# Patient Record
Sex: MALE | Race: WHITE | NOT HISPANIC OR LATINO
[De-identification: names, ages, dates, MRNs, and addresses within clinical notes are randomized per-mention and may not be internally consistent; named-entity substitution may affect disease eponyms.]

---

## 2020-11-17 ENCOUNTER — APPOINTMENT (OUTPATIENT)
Dept: ELECTROPHYSIOLOGY | Facility: CLINIC | Age: 85
End: 2020-11-17
Payer: MEDICARE

## 2020-11-17 ENCOUNTER — NON-APPOINTMENT (OUTPATIENT)
Age: 85
End: 2020-11-17

## 2020-11-17 VITALS
HEART RATE: 96 BPM | OXYGEN SATURATION: 99 % | DIASTOLIC BLOOD PRESSURE: 84 MMHG | WEIGHT: 216 LBS | RESPIRATION RATE: 14 BRPM | SYSTOLIC BLOOD PRESSURE: 125 MMHG | TEMPERATURE: 94.3 F | HEIGHT: 60 IN | BODY MASS INDEX: 42.41 KG/M2

## 2020-11-17 DIAGNOSIS — Z87.891 PERSONAL HISTORY OF NICOTINE DEPENDENCE: ICD-10-CM

## 2020-11-17 DIAGNOSIS — N40.0 BENIGN PROSTATIC HYPERPLASIA WITHOUT LOWER URINARY TRACT SYMPMS: ICD-10-CM

## 2020-11-17 DIAGNOSIS — I10 ESSENTIAL (PRIMARY) HYPERTENSION: ICD-10-CM

## 2020-11-17 DIAGNOSIS — I48.21 PERMANENT ATRIAL FIBRILLATION: ICD-10-CM

## 2020-11-17 DIAGNOSIS — G45.9 TRANSIENT CEREBRAL ISCHEMIC ATTACK, UNSPECIFIED: ICD-10-CM

## 2020-11-17 DIAGNOSIS — I49.5 SICK SINUS SYNDROME: ICD-10-CM

## 2020-11-17 DIAGNOSIS — Z85.46 PERSONAL HISTORY OF MALIGNANT NEOPLASM OF PROSTATE: ICD-10-CM

## 2020-11-17 PROBLEM — Z00.00 ENCOUNTER FOR PREVENTIVE HEALTH EXAMINATION: Status: ACTIVE | Noted: 2020-11-17

## 2020-11-17 PROCEDURE — 99204 OFFICE O/P NEW MOD 45 MIN: CPT

## 2020-11-17 PROCEDURE — 93000 ELECTROCARDIOGRAM COMPLETE: CPT

## 2020-11-17 RX ORDER — TAMSULOSIN HYDROCHLORIDE 0.4 MG/1
0.4 CAPSULE ORAL
Qty: 30 | Refills: 3 | Status: ACTIVE | COMMUNITY
Start: 2020-11-17

## 2020-11-17 RX ORDER — WARFARIN 5 MG/1
5 TABLET ORAL DAILY
Qty: 90 | Refills: 3 | Status: ACTIVE | COMMUNITY
Start: 2020-11-17

## 2020-11-17 RX ORDER — METOPROLOL TARTRATE 50 MG/1
50 TABLET, FILM COATED ORAL
Qty: 60 | Refills: 3 | Status: ACTIVE | COMMUNITY
Start: 2020-11-17

## 2020-11-17 NOTE — HISTORY OF PRESENT ILLNESS
[FreeTextEntry1] : Lalito Zamudio MD\par \par Chevy Hernandez is an 84y/o man with Hx of HTN, TIA, prostate CA/BPH, and permanent atrial fibrillation, on warfarin, who presents today for initial evaluation. Admits doing well with no issues or complaints. Denies any dizziness or feeling lightheaded. Denies chest pain, palpitations, SOB, syncope or near syncope. Recently underwent Holter monitoring which revealed permanent afib, average heart rate 57bpm, max heart rate 104bpm and low heart rate with pauses in the 30s during sleeping hours. Has been on metoprolol 50mg daily.

## 2020-11-17 NOTE — DISCUSSION/SUMMARY
[FreeTextEntry1] : Chevy Hernandez is an 86y/o man with Hx of HTN, TIA, prostate CA/BPH, and permanent atrial fibrillation, on warfarin, who presents today for initial evaluation. \par \par Impression:\par \par 1. Permanent atrial fibrillation: EKG today afib, 96bpm. Review of Holter reveals no evidence of RVR and bradycardia/pauses during sleeping hours down to the 30s. No pauses of 5 sec or greater. Given asymptomatic and no sustained bradyarrhythmias, consider decreasing metoprolol and re monitoring to assess heart rate response. If concern for tachy dony syndrome, can consider need for MICRA pacemaker but patient prefers to avoid PPM if possible. Would also look for GUNNER to explain bradyarrhythmias at night. Patient's bicarbonate normal according to Dr. Zamudio, so low index of suspicion for hypercarbia. \par \par 2. HTN: resume oral antihypertensives as prescribed. Encouraged heart healthy diet, sodium restriction, and weight loss. Continue regular f/u with Cardiologist for further HTN management.\par \par 3. BPH: resume tamsulosin as prescribed. \par \par Sincerely,\par \par Rubio Diaz MD

## 2020-11-17 NOTE — PHYSICAL EXAM
[General Appearance - Well Developed] : well developed [Normal Appearance] : normal appearance [Well Groomed] : well groomed [General Appearance - Well Nourished] : well nourished [No Deformities] : no deformities [General Appearance - In No Acute Distress] : no acute distress [Normal Conjunctiva] : the conjunctiva exhibited no abnormalities [Eyelids - No Xanthelasma] : the eyelids demonstrated no xanthelasmas [Normal Oral Mucosa] : normal oral mucosa [No Oral Pallor] : no oral pallor [No Oral Cyanosis] : no oral cyanosis [Normal Jugular Venous A Waves Present] : normal jugular venous A waves present [Normal Jugular Venous V Waves Present] : normal jugular venous V waves present [No Jugular Venous Summers A Waves] : no jugular venous summres A waves [Heart Sounds] : normal S1 and S2 [Murmurs] : no murmurs present [Respiration, Rhythm And Depth] : normal respiratory rhythm and effort [Exaggerated Use Of Accessory Muscles For Inspiration] : no accessory muscle use [Auscultation Breath Sounds / Voice Sounds] : lungs were clear to auscultation bilaterally [Abdomen Soft] : soft [Abdomen Tenderness] : non-tender [Abdomen Mass (___ Cm)] : no abdominal mass palpated [Abnormal Walk] : normal gait [Gait - Sufficient For Exercise Testing] : the gait was sufficient for exercise testing [Nail Clubbing] : no clubbing of the fingernails [Cyanosis, Localized] : no localized cyanosis [Petechial Hemorrhages (___cm)] : no petechial hemorrhages [Skin Color & Pigmentation] : normal skin color and pigmentation [] : no rash [No Venous Stasis] : no venous stasis [Skin Lesions] : no skin lesions [No Skin Ulcers] : no skin ulcer [No Xanthoma] : no  xanthoma was observed [Oriented To Time, Place, And Person] : oriented to person, place, and time [Affect] : the affect was normal [Mood] : the mood was normal [No Anxiety] : not feeling anxious [FreeTextEntry1] : irregular rate/rhythm

## 2020-12-09 ENCOUNTER — APPOINTMENT (OUTPATIENT)
Dept: PULMONOLOGY | Facility: CLINIC | Age: 85
End: 2020-12-09
Payer: MEDICARE

## 2020-12-09 VITALS
SYSTOLIC BLOOD PRESSURE: 148 MMHG | HEART RATE: 86 BPM | TEMPERATURE: 98.3 F | DIASTOLIC BLOOD PRESSURE: 81 MMHG | OXYGEN SATURATION: 96 %

## 2020-12-09 DIAGNOSIS — U07.1 COVID-19: ICD-10-CM

## 2020-12-09 PROCEDURE — 36415 COLL VENOUS BLD VENIPUNCTURE: CPT

## 2020-12-09 PROCEDURE — 99072 ADDL SUPL MATRL&STAF TM PHE: CPT

## 2020-12-09 PROCEDURE — 99204 OFFICE O/P NEW MOD 45 MIN: CPT | Mod: 25

## 2020-12-10 NOTE — PROCEDURE
[FreeTextEntry1] : CXR of December 8, 2020 was reviewed.  As described there are mild increase in interstitial markings at the right lung base.  There is elevation of the left hemidiaphragm and probable mild increased markings at the left base as well.  There are no consolidative changes.  Old films are not available for comparison

## 2020-12-10 NOTE — PHYSICAL EXAM
[No Acute Distress] : no acute distress [Supple] : supple [No JVD] : no jvd [Normal S1, S2] : normal s1, s2 [No Murmurs] : no murmurs [Normal to Percussion] : normal to percussion [No Abnormalities] : no abnormalities [Benign] : benign [Not Tender] : not tender [No HSM] : no hsm [No Clubbing] : no clubbing [No Edema] : no edema [No Focal Deficits] : no focal deficits [Oriented x3] : oriented x3 [TextBox_68] : 1 plus crackles right base. Decreased breath sounds left base.

## 2020-12-10 NOTE — DISCUSSION/SUMMARY
[FreeTextEntry1] : Radiographic and clinical findings are more likely chronic then related to an acute pneumonia.  Patient is clinically well without significant respiratory symptoms.  Must consider possibility of underlying bronchiectasis or interstitial lung disease.\par Naturally acute infectious process such as atypical pneumonia or Covid pneumonia should first be excluded.

## 2020-12-10 NOTE — HISTORY OF PRESENT ILLNESS
[Former] : former [TextBox_4] : NORMA DINH is a 85 year old  M referred for pulmonary evaluation for abnormal CXR.\par \par Denies cough, wheeze, chest congestion. \par At night mild SOB then falls asleep and feels OK.  Did not present for few months.  Denies chest discomfort.  Denies significant snoring\par Not ill\par No fever, chills , wt. loss.\par \par Past pulmonary history. N\par Occupational Exposure. N\par Family history of pulmonary disease. Father \par Recent travel N\par Pets N\par \par [place] [facility] [place].\par \par Not aware of old films.\par \par \par \par \par  [TextBox_11] : 1 [TextBox_13] : 41-42 [YearQuit] : 1985

## 2020-12-10 NOTE — ASSESSMENT
[FreeTextEntry1] : Obtain CBC.\par Covid swab performed.\par Further recommendations after review of above.  High-resolution CT may be of value.

## 2020-12-14 DIAGNOSIS — J84.9 INTERSTITIAL PULMONARY DISEASE, UNSPECIFIED: ICD-10-CM

## 2020-12-14 LAB
BASOPHILS # BLD AUTO: 0.07 K/UL
BASOPHILS NFR BLD AUTO: 0.8 %
EOSINOPHIL # BLD AUTO: 0.31 K/UL
EOSINOPHIL NFR BLD AUTO: 3.4 %
HCT VFR BLD CALC: 49.5 %
HGB BLD-MCNC: 16.1 G/DL
IMM GRANULOCYTES NFR BLD AUTO: 0.3 %
LYMPHOCYTES # BLD AUTO: 1.98 K/UL
LYMPHOCYTES NFR BLD AUTO: 21.5 %
MAN DIFF?: NORMAL
MCHC RBC-ENTMCNC: 32.5 GM/DL
MCHC RBC-ENTMCNC: 32.7 PG
MCV RBC AUTO: 100.6 FL
MONOCYTES # BLD AUTO: 0.66 K/UL
MONOCYTES NFR BLD AUTO: 7.2 %
NEUTROPHILS # BLD AUTO: 6.18 K/UL
NEUTROPHILS NFR BLD AUTO: 66.8 %
PLATELET # BLD AUTO: 167 K/UL
RBC # BLD: 4.92 M/UL
RBC # FLD: 12.5 %
SARS-COV-2 N GENE NPH QL NAA+PROBE: NOT DETECTED
WBC # FLD AUTO: 9.23 K/UL

## 2022-01-18 ENCOUNTER — APPOINTMENT (OUTPATIENT)
Dept: SURGERY | Facility: CLINIC | Age: 87
End: 2022-01-18
Payer: MEDICARE

## 2022-01-18 VITALS
HEIGHT: 73 IN | SYSTOLIC BLOOD PRESSURE: 150 MMHG | DIASTOLIC BLOOD PRESSURE: 90 MMHG | WEIGHT: 225 LBS | BODY MASS INDEX: 29.82 KG/M2

## 2022-01-18 DIAGNOSIS — Z78.9 OTHER SPECIFIED HEALTH STATUS: ICD-10-CM

## 2022-01-18 DIAGNOSIS — Z83.49 FAMILY HISTORY OF OTHER ENDOCRINE, NUTRITIONAL AND METABOLIC DISEASES: ICD-10-CM

## 2022-01-18 PROCEDURE — 99203 OFFICE O/P NEW LOW 30 MIN: CPT

## 2022-01-18 RX ORDER — WARFARIN 1 MG/1
1 TABLET ORAL
Qty: 270 | Refills: 0 | Status: ACTIVE | COMMUNITY
Start: 2021-11-29

## 2022-01-23 PROBLEM — Z78.9 DRINKS WINE: Status: ACTIVE | Noted: 2022-01-23

## 2022-01-23 PROBLEM — Z83.49 FAMILY HISTORY OF GOITER: Status: ACTIVE | Noted: 2022-01-23

## 2022-01-23 NOTE — REASON FOR VISIT
[Initial Consultation] : an initial consultation for [FreeTextEntry2] : Multinodular goiter [Other: _____] : [unfilled]

## 2022-01-23 NOTE — HISTORY OF PRESENT ILLNESS
[de-identified] : Patient referred by Dr. Zamudio for evaluation of multinodular goiter.  Patient was noted to have thyroid nodule on recent carotid duplex.  Thyroid ultrasound December 2021: Right lobe 4.6 x 2.5 x 2.7 cm.  With mid 6 mm cyst, lower 9 mm cyst and 9 mm isthmus cyst.  Left lobe 5.3 x 2.8 x 2.7 cm with mid 1.4 x 1.3 x 1.4 cm nodule.  Calcium 9.6, no TFTs available.  Patient denies prior history of thyroid disease dysphagia or change in voice.  Patient reports radiation treatments for prostate cancer 15 years ago. I have reviewed all old and new data and available images.

## 2022-01-23 NOTE — PHYSICAL EXAM
[de-identified] : no cervical or supraclavicular adenopathy, trachea midline, low lying  thyroid without enlargement or palpable mass [Normal] : no neck adenopathy [de-identified] : Skin:  normal appearance.  no rash, nodules, vesicles, or erythema,\par Musculoskeletal:  full range of motion and no deformities appreciated\par Neurological:  grossly intact\par Psychiatric:  oriented to person, place and time with appropriate affect

## 2022-01-23 NOTE — CONSULT LETTER
[Dear  ___] : Dear  [unfilled], [Consult Letter:] : I had the pleasure of evaluating your patient, [unfilled]. [Please see my note below.] : Please see my note below. [Consult Closing:] : Thank you very much for allowing me to participate in the care of this patient.  If you have any questions, please do not hesitate to contact me. [Sincerely,] : Sincerely, [FreeTextEntry2] : Dr. Urbano Zamudio [FreeTextEntry3] : Peggy Almanza MD, FACS\par Assistant Professor of Surgery and Otolaryngology\par [facility]\par

## 2022-01-23 NOTE — ASSESSMENT
[FreeTextEntry1] : Patient with recently noted left thyroid nodule.  I have discussed a biopsy versus observation with the patient.  He would like to avoid a biopsy at this time.  I have requested a follow-up ultrasound June 2022.  If nodule enlarges or suspicious characteristics develop, a biopsy will be performed at that time.  RTO 6 months.  I have answered his questions to the best of my ability.

## 2022-07-19 ENCOUNTER — APPOINTMENT (OUTPATIENT)
Dept: SURGERY | Facility: CLINIC | Age: 87
End: 2022-07-19

## 2023-01-10 ENCOUNTER — RESULT REVIEW (OUTPATIENT)
Age: 88
End: 2023-01-10

## 2023-01-10 ENCOUNTER — APPOINTMENT (OUTPATIENT)
Dept: SURGERY | Facility: CLINIC | Age: 88
End: 2023-01-10
Payer: MEDICARE

## 2023-01-10 VITALS
BODY MASS INDEX: 28.88 KG/M2 | DIASTOLIC BLOOD PRESSURE: 78 MMHG | HEART RATE: 71 BPM | HEIGHT: 74 IN | SYSTOLIC BLOOD PRESSURE: 145 MMHG | WEIGHT: 225 LBS

## 2023-01-10 PROCEDURE — 99204 OFFICE O/P NEW MOD 45 MIN: CPT

## 2023-01-15 NOTE — REASON FOR VISIT
[Initial Consultation] : an initial consultation for [FreeTextEntry2] : thyroid nodule [Other: _____] : [unfilled]

## 2023-01-15 NOTE — ASSESSMENT
[FreeTextEntry1] : requested sonogram guided fine needle aspiration cytology thyroid nodules with cyto tech present to confirm adequacy of specimen. to call next week for results. patient has been given the opportunity to ask questions, and all of the patient's questions have been answered to their satisfaction\par

## 2023-01-15 NOTE — PHYSICAL EXAM
[de-identified] : 1 cm firm right and 1.2 cm left thyroid nodule well circumscribed and mobile [Laryngoscopy Performed] : laryngoscopy was performed, see procedure section for findings [Midline] : located in midline position [Normal] : cranial nerves 2-12 intact [de-identified] : indirect  laryngoscopy shows normal vocal cord mobility bilaterally with no lesions noted

## 2023-01-15 NOTE — CONSULT LETTER
[Dear  ___] : Dear  [unfilled], [Consult Letter:] : I had the pleasure of evaluating your patient, [unfilled]. [Please see my note below.] : Please see my note below. [Consult Closing:] : Thank you very much for allowing me to participate in the care of this patient.  If you have any questions, please do not hesitate to contact me. [Sincerely,] : Sincerely, [FreeTextEntry2] : Dr. Urbano Zamudio [FreeTextEntry3] : Yogesh Brown MD, FACS\par System Director, Endocrine Surgery\par [facility]\par Associate  Professor of Surgery\par [employer] [facility] [employer] [facility] [employer] at [facility]

## 2023-01-19 ENCOUNTER — OUTPATIENT (OUTPATIENT)
Dept: OUTPATIENT SERVICES | Facility: HOSPITAL | Age: 88
LOS: 1 days | End: 2023-01-19
Payer: COMMERCIAL

## 2023-01-19 ENCOUNTER — RESULT REVIEW (OUTPATIENT)
Age: 88
End: 2023-01-19

## 2023-01-19 ENCOUNTER — APPOINTMENT (OUTPATIENT)
Dept: ULTRASOUND IMAGING | Facility: IMAGING CENTER | Age: 88
End: 2023-01-19
Payer: MEDICARE

## 2023-01-19 DIAGNOSIS — E04.2 NONTOXIC MULTINODULAR GOITER: ICD-10-CM

## 2023-01-19 PROCEDURE — 88173 CYTOPATH EVAL FNA REPORT: CPT | Mod: 26

## 2023-01-19 PROCEDURE — 88173 CYTOPATH EVAL FNA REPORT: CPT

## 2023-01-19 PROCEDURE — 10005 FNA BX W/US GDN 1ST LES: CPT

## 2023-01-19 PROCEDURE — 88172 CYTP DX EVAL FNA 1ST EA SITE: CPT

## 2023-01-19 PROCEDURE — 10006 FNA BX W/US GDN EA ADDL: CPT

## 2023-01-20 LAB — NON-GYNECOLOGICAL CYTOLOGY STUDY: SIGNIFICANT CHANGE UP

## 2023-01-23 ENCOUNTER — NON-APPOINTMENT (OUTPATIENT)
Age: 88
End: 2023-01-23

## 2023-01-23 DIAGNOSIS — E04.2 NONTOXIC MULTINODULAR GOITER: ICD-10-CM

## 2023-08-22 ENCOUNTER — APPOINTMENT (OUTPATIENT)
Dept: SURGERY | Facility: CLINIC | Age: 88
End: 2023-08-22

## 2024-05-18 ENCOUNTER — INPATIENT (INPATIENT)
Facility: HOSPITAL | Age: 89
LOS: 4 days | Discharge: HOME HEALTH | DRG: 690 | End: 2024-05-23
Attending: GENERAL PRACTICE | Admitting: GENERAL PRACTICE
Payer: COMMERCIAL

## 2024-05-18 VITALS
OXYGEN SATURATION: 96 % | RESPIRATION RATE: 25 BRPM | WEIGHT: 229.94 LBS | HEART RATE: 76 BPM | HEIGHT: 74 IN | TEMPERATURE: 99 F | DIASTOLIC BLOOD PRESSURE: 67 MMHG | SYSTOLIC BLOOD PRESSURE: 103 MMHG

## 2024-05-18 LAB
ALBUMIN SERPL ELPH-MCNC: 3.5 G/DL — SIGNIFICANT CHANGE UP (ref 3.3–5)
ALP SERPL-CCNC: 83 U/L — SIGNIFICANT CHANGE UP (ref 40–120)
ALT FLD-CCNC: 29 U/L — SIGNIFICANT CHANGE UP (ref 10–45)
ANION GAP SERPL CALC-SCNC: 12 MMOL/L — SIGNIFICANT CHANGE UP (ref 5–17)
APTT BLD: 38.7 SEC — HIGH (ref 24.5–35.6)
AST SERPL-CCNC: 41 U/L — HIGH (ref 10–40)
BASE EXCESS BLDV CALC-SCNC: 3 MMOL/L — SIGNIFICANT CHANGE UP (ref -2–3)
BASOPHILS # BLD AUTO: 0.04 K/UL — SIGNIFICANT CHANGE UP (ref 0–0.2)
BASOPHILS NFR BLD AUTO: 0.3 % — SIGNIFICANT CHANGE UP (ref 0–2)
BILIRUB SERPL-MCNC: 0.9 MG/DL — SIGNIFICANT CHANGE UP (ref 0.2–1.2)
BUN SERPL-MCNC: 22 MG/DL — SIGNIFICANT CHANGE UP (ref 7–23)
CA-I SERPL-SCNC: 1.18 MMOL/L — SIGNIFICANT CHANGE UP (ref 1.15–1.33)
CALCIUM SERPL-MCNC: 9.1 MG/DL — SIGNIFICANT CHANGE UP (ref 8.4–10.5)
CHLORIDE BLDV-SCNC: 99 MMOL/L — SIGNIFICANT CHANGE UP (ref 96–108)
CHLORIDE SERPL-SCNC: 101 MMOL/L — SIGNIFICANT CHANGE UP (ref 96–108)
CO2 BLDV-SCNC: 28 MMOL/L — HIGH (ref 22–26)
CO2 SERPL-SCNC: 24 MMOL/L — SIGNIFICANT CHANGE UP (ref 22–31)
CREAT SERPL-MCNC: 0.86 MG/DL — SIGNIFICANT CHANGE UP (ref 0.5–1.3)
EGFR: 84 ML/MIN/1.73M2 — SIGNIFICANT CHANGE UP
EOSINOPHIL # BLD AUTO: 0.01 K/UL — SIGNIFICANT CHANGE UP (ref 0–0.5)
EOSINOPHIL NFR BLD AUTO: 0.1 % — SIGNIFICANT CHANGE UP (ref 0–6)
GAS PNL BLDV: 133 MMOL/L — LOW (ref 136–145)
GAS PNL BLDV: SIGNIFICANT CHANGE UP
GLUCOSE BLDV-MCNC: 152 MG/DL — HIGH (ref 70–99)
GLUCOSE SERPL-MCNC: 159 MG/DL — HIGH (ref 70–99)
HCO3 BLDV-SCNC: 27 MMOL/L — SIGNIFICANT CHANGE UP (ref 22–29)
HCT VFR BLD CALC: 40.2 % — SIGNIFICANT CHANGE UP (ref 39–50)
HCT VFR BLDA CALC: 41 % — SIGNIFICANT CHANGE UP (ref 39–51)
HGB BLD CALC-MCNC: 13.6 G/DL — SIGNIFICANT CHANGE UP (ref 12.6–17.4)
HGB BLD-MCNC: 13.2 G/DL — SIGNIFICANT CHANGE UP (ref 13–17)
IMM GRANULOCYTES NFR BLD AUTO: 0.6 % — SIGNIFICANT CHANGE UP (ref 0–0.9)
INR BLD: 2.93 RATIO — HIGH (ref 0.85–1.18)
LACTATE BLDV-MCNC: 1.2 MMOL/L — SIGNIFICANT CHANGE UP (ref 0.5–2)
LYMPHOCYTES # BLD AUTO: 1.77 K/UL — SIGNIFICANT CHANGE UP (ref 1–3.3)
LYMPHOCYTES # BLD AUTO: 14.5 % — SIGNIFICANT CHANGE UP (ref 13–44)
MCHC RBC-ENTMCNC: 32.7 PG — SIGNIFICANT CHANGE UP (ref 27–34)
MCHC RBC-ENTMCNC: 32.8 GM/DL — SIGNIFICANT CHANGE UP (ref 32–36)
MCV RBC AUTO: 99.5 FL — SIGNIFICANT CHANGE UP (ref 80–100)
MONOCYTES # BLD AUTO: 1.03 K/UL — HIGH (ref 0–0.9)
MONOCYTES NFR BLD AUTO: 8.4 % — SIGNIFICANT CHANGE UP (ref 2–14)
NEUTROPHILS # BLD AUTO: 9.31 K/UL — HIGH (ref 1.8–7.4)
NEUTROPHILS NFR BLD AUTO: 76.1 % — SIGNIFICANT CHANGE UP (ref 43–77)
NRBC # BLD: 0 /100 WBCS — SIGNIFICANT CHANGE UP (ref 0–0)
PCO2 BLDV: 39 MMHG — LOW (ref 42–55)
PH BLDV: 7.45 — HIGH (ref 7.32–7.43)
PLATELET # BLD AUTO: 195 K/UL — SIGNIFICANT CHANGE UP (ref 150–400)
PO2 BLDV: 65 MMHG — HIGH (ref 25–45)
POTASSIUM BLDV-SCNC: 4.3 MMOL/L — SIGNIFICANT CHANGE UP (ref 3.5–5.1)
POTASSIUM SERPL-MCNC: 4.3 MMOL/L — SIGNIFICANT CHANGE UP (ref 3.5–5.3)
POTASSIUM SERPL-SCNC: 4.3 MMOL/L — SIGNIFICANT CHANGE UP (ref 3.5–5.3)
PROT SERPL-MCNC: 6.6 G/DL — SIGNIFICANT CHANGE UP (ref 6–8.3)
PROTHROM AB SERPL-ACNC: 29.8 SEC — HIGH (ref 9.5–13)
RBC # BLD: 4.04 M/UL — LOW (ref 4.2–5.8)
RBC # FLD: 12.4 % — SIGNIFICANT CHANGE UP (ref 10.3–14.5)
SAO2 % BLDV: 94.6 % — HIGH (ref 67–88)
SODIUM SERPL-SCNC: 137 MMOL/L — SIGNIFICANT CHANGE UP (ref 135–145)
TROPONIN T, HIGH SENSITIVITY RESULT: 49 NG/L — SIGNIFICANT CHANGE UP (ref 0–51)
WBC # BLD: 12.23 K/UL — HIGH (ref 3.8–10.5)
WBC # FLD AUTO: 12.23 K/UL — HIGH (ref 3.8–10.5)

## 2024-05-18 PROCEDURE — 99285 EMERGENCY DEPT VISIT HI MDM: CPT

## 2024-05-18 PROCEDURE — 71045 X-RAY EXAM CHEST 1 VIEW: CPT | Mod: 26

## 2024-05-18 RX ORDER — ALBUTEROL 90 UG/1
2.5 AEROSOL, METERED ORAL
Refills: 0 | Status: DISCONTINUED | OUTPATIENT
Start: 2024-05-18 | End: 2024-05-18

## 2024-05-18 RX ORDER — AZITHROMYCIN 500 MG/1
500 TABLET, FILM COATED ORAL ONCE
Refills: 0 | Status: COMPLETED | OUTPATIENT
Start: 2024-05-18 | End: 2024-05-18

## 2024-05-18 RX ORDER — IPRATROPIUM/ALBUTEROL SULFATE 18-103MCG
3 AEROSOL WITH ADAPTER (GRAM) INHALATION
Refills: 0 | Status: COMPLETED | OUTPATIENT
Start: 2024-05-18 | End: 2024-05-18

## 2024-05-18 RX ORDER — CEFTRIAXONE 500 MG/1
1000 INJECTION, POWDER, FOR SOLUTION INTRAMUSCULAR; INTRAVENOUS ONCE
Refills: 0 | Status: COMPLETED | OUTPATIENT
Start: 2024-05-18 | End: 2024-05-18

## 2024-05-18 RX ORDER — SODIUM CHLORIDE 9 MG/ML
1000 INJECTION, SOLUTION INTRAVENOUS ONCE
Refills: 0 | Status: COMPLETED | OUTPATIENT
Start: 2024-05-18 | End: 2024-05-18

## 2024-05-18 RX ADMIN — Medication 3 MILLILITER(S): at 23:41

## 2024-05-18 RX ADMIN — Medication 3 MILLILITER(S): at 22:57

## 2024-05-18 RX ADMIN — SODIUM CHLORIDE 1000 MILLILITER(S): 9 INJECTION, SOLUTION INTRAVENOUS at 23:07

## 2024-05-18 RX ADMIN — CEFTRIAXONE 100 MILLIGRAM(S): 500 INJECTION, POWDER, FOR SOLUTION INTRAMUSCULAR; INTRAVENOUS at 22:57

## 2024-05-18 RX ADMIN — AZITHROMYCIN 255 MILLIGRAM(S): 500 TABLET, FILM COATED ORAL at 23:41

## 2024-05-18 NOTE — ED ADULT NURSE REASSESSMENT NOTE - NS ED NURSE REASSESS COMMENT FT1
Report received from Eliot GABRIEL. Pt A&Ox4, in no acute distress at time. Patient safety maintained, bed is in lowest position, wheels locked, and side rails raised.

## 2024-05-18 NOTE — ED ADULT NURSE NOTE - CHPI ED NUR SYMPTOMS NEG
no abdominal pain/no chills/no cough/no decreased eating/drinking/no diarrhea/no fever/no headache/no rash/no vomiting

## 2024-05-18 NOTE — ED ADULT NURSE NOTE - OBJECTIVE STATEMENT
89yo M PMH Afib (on warfarin), HTN, Prostate Cancer (20 years ago) complains of difficulty breathing past week. Patient had burning urination 1 week ago, went to doctor and was given Abx that resolved burning urination. Patient does not remember name of abx. Patient developed difficulty breathing in past week, went back to PCP who informed him of "blood infection", sent for CXR yesterday, resulted today showing pneumonia and patient was sent to ED for evaluation. Patient reports increasing weakness past two weeks. Currently reports no urinary symptoms. Patient denies chest pain, fevers, chills, falls, injuries, N/V/D, abdominal pain, cough, congestion, dizziness, numbness.

## 2024-05-18 NOTE — ED PROVIDER NOTE - ATTENDING CONTRIBUTION TO CARE
Attending ANDREA Rothman I performed a history and physical exam of the patient and discussed their management with the resident. I reviewed the resident's note and agree with the documented findings and plan of care, except as noted. My medical decision making and observations are as follows:    89 M with PMH prostate cancer s/p treatment, HTN, A-fib on warfarin, former smoker, referred to ED for admission for pneumonia seen on outpatient chest x-ray.  Patient reports dysuria 1 week ago, was prescribed unspecified antibiotic by PMD and urinary symptoms have since completely resolved.  However, patient since developed progressively worsening shortness of breath with orthopnea; chest x-ray obtained 1 day ago was concerning for pneumonia and blood work was reportedly concerning for "blood infection". Patient denies fever, chest pain, cough, abdominal pain, GI symptoms, hematuria, leg swelling.  On exam, patient in no acute distress, normal work of breathing, speaking full sentences.  Heart sounds regular, lungs with mild diffuse wheezing, decreased breath sounds to right upper lung field, no CVA tenderness, abdomen soft nontender, no pedal edema.    MDM–vital signs stable, however WBC obtained 5/17 as seen on HIE 15.6.  Will obtain sepsis labs, viral swab, x-ray chest (unable to view outpatient x-ray on EMR), urinalysis, empiric antibiotics, nebulizer treatment given wheezing on exam.  Patient likely has undiagnosed COPD.    2300-  patient signed out to Dr. Gutierrez pending labs, reassessment, likely admission to Dr. Zamudio

## 2024-05-18 NOTE — ED ADULT NURSE NOTE - NSFALLHARMRISKINTERV_ED_ALL_ED

## 2024-05-19 DIAGNOSIS — J18.9 PNEUMONIA, UNSPECIFIED ORGANISM: ICD-10-CM

## 2024-05-19 DIAGNOSIS — I48.20 CHRONIC ATRIAL FIBRILLATION, UNSPECIFIED: ICD-10-CM

## 2024-05-19 DIAGNOSIS — I10 ESSENTIAL (PRIMARY) HYPERTENSION: ICD-10-CM

## 2024-05-19 DIAGNOSIS — N39.0 URINARY TRACT INFECTION, SITE NOT SPECIFIED: ICD-10-CM

## 2024-05-19 LAB
APPEARANCE UR: ABNORMAL
BACTERIA # UR AUTO: ABNORMAL /HPF
BILIRUB UR-MCNC: NEGATIVE — SIGNIFICANT CHANGE UP
CAST: 7 /LPF — HIGH (ref 0–4)
COLOR SPEC: SIGNIFICANT CHANGE UP
DIFF PNL FLD: ABNORMAL
FLUAV AG NPH QL: SIGNIFICANT CHANGE UP
FLUBV AG NPH QL: SIGNIFICANT CHANGE UP
GLUCOSE UR QL: NEGATIVE MG/DL — SIGNIFICANT CHANGE UP
INR BLD: 3.33 RATIO — HIGH (ref 0.85–1.18)
KETONES UR-MCNC: NEGATIVE MG/DL — SIGNIFICANT CHANGE UP
LEUKOCYTE ESTERASE UR-ACNC: ABNORMAL
NITRITE UR-MCNC: POSITIVE
PH UR: 6 — SIGNIFICANT CHANGE UP (ref 5–8)
PROT UR-MCNC: 300 MG/DL
PROTHROM AB SERPL-ACNC: 35.4 SEC — HIGH (ref 9.5–13)
RBC CASTS # UR COMP ASSIST: 162 /HPF — HIGH (ref 0–4)
REVIEW: SIGNIFICANT CHANGE UP
RSV RNA NPH QL NAA+NON-PROBE: SIGNIFICANT CHANGE UP
SARS-COV-2 RNA SPEC QL NAA+PROBE: SIGNIFICANT CHANGE UP
SP GR SPEC: 1.02 — SIGNIFICANT CHANGE UP (ref 1–1.03)
SQUAMOUS # UR AUTO: 2 /HPF — SIGNIFICANT CHANGE UP (ref 0–5)
UROBILINOGEN FLD QL: 1 MG/DL — SIGNIFICANT CHANGE UP (ref 0.2–1)
WBC UR QL: >998 /HPF — HIGH (ref 0–5)

## 2024-05-19 PROCEDURE — 99222 1ST HOSP IP/OBS MODERATE 55: CPT | Mod: GC

## 2024-05-19 RX ORDER — PIPERACILLIN AND TAZOBACTAM 4; .5 G/20ML; G/20ML
3.38 INJECTION, POWDER, LYOPHILIZED, FOR SOLUTION INTRAVENOUS EVERY 8 HOURS
Refills: 0 | Status: DISCONTINUED | OUTPATIENT
Start: 2024-05-20 | End: 2024-05-22

## 2024-05-19 RX ORDER — METOPROLOL TARTRATE 50 MG
50 TABLET ORAL DAILY
Refills: 0 | Status: DISCONTINUED | OUTPATIENT
Start: 2024-05-19 | End: 2024-05-23

## 2024-05-19 RX ORDER — WARFARIN SODIUM 2.5 MG/1
2 TABLET ORAL ONCE
Refills: 0 | Status: COMPLETED | OUTPATIENT
Start: 2024-05-19 | End: 2024-05-19

## 2024-05-19 RX ORDER — PIPERACILLIN AND TAZOBACTAM 4; .5 G/20ML; G/20ML
3.38 INJECTION, POWDER, LYOPHILIZED, FOR SOLUTION INTRAVENOUS ONCE
Refills: 0 | Status: COMPLETED | OUTPATIENT
Start: 2024-05-19 | End: 2024-05-19

## 2024-05-19 RX ORDER — PIPERACILLIN AND TAZOBACTAM 4; .5 G/20ML; G/20ML
3.38 INJECTION, POWDER, LYOPHILIZED, FOR SOLUTION INTRAVENOUS ONCE
Refills: 0 | Status: COMPLETED | OUTPATIENT
Start: 2024-05-20 | End: 2024-05-20

## 2024-05-19 RX ORDER — METOPROLOL TARTRATE 50 MG
1 TABLET ORAL
Refills: 0 | DISCHARGE

## 2024-05-19 RX ORDER — ACETAMINOPHEN 500 MG
650 TABLET ORAL EVERY 6 HOURS
Refills: 0 | Status: DISCONTINUED | OUTPATIENT
Start: 2024-05-19 | End: 2024-05-23

## 2024-05-19 RX ORDER — LANOLIN ALCOHOL/MO/W.PET/CERES
3 CREAM (GRAM) TOPICAL AT BEDTIME
Refills: 0 | Status: DISCONTINUED | OUTPATIENT
Start: 2024-05-19 | End: 2024-05-23

## 2024-05-19 RX ORDER — ONDANSETRON 8 MG/1
4 TABLET, FILM COATED ORAL EVERY 8 HOURS
Refills: 0 | Status: DISCONTINUED | OUTPATIENT
Start: 2024-05-19 | End: 2024-05-23

## 2024-05-19 RX ADMIN — Medication 1 TABLET(S): at 11:00

## 2024-05-19 RX ADMIN — WARFARIN SODIUM 2 MILLIGRAM(S): 2.5 TABLET ORAL at 22:06

## 2024-05-19 RX ADMIN — PIPERACILLIN AND TAZOBACTAM 200 GRAM(S): 4; .5 INJECTION, POWDER, LYOPHILIZED, FOR SOLUTION INTRAVENOUS at 14:24

## 2024-05-19 RX ADMIN — Medication 50 MILLIGRAM(S): at 11:00

## 2024-05-19 RX ADMIN — Medication 3 MILLILITER(S): at 00:34

## 2024-05-19 RX ADMIN — PIPERACILLIN AND TAZOBACTAM 25 GRAM(S): 4; .5 INJECTION, POWDER, LYOPHILIZED, FOR SOLUTION INTRAVENOUS at 17:58

## 2024-05-19 NOTE — ED ADULT NURSE REASSESSMENT NOTE - NS ED NURSE REASSESS COMMENT FT1
Patient found in stretcher breathing spontaneously and unlabored on Room Air. No signs of respiratory distress @ this time. The patient is alert and orientated x4 and responds appropriately. Patient is ambulatory up @ trish with a steady gait. Patient found inc of urine on both his clothes and linens. Full bed linen change provided, patient assisted with cleaning up, and new gown and briefs in place. The patient presents with a 20G PIV to the Right Forearm and a 18G PIV to the Left Forearm  that is C/D/I and infusing Zosyn @ this time. Pt denies chest pain, palpitations, shortness of breath, headache, visual disturbances, numbness/tingling, fever, chills, diaphoresis,  nausea, vomiting, constipation, diarrhea, or urinary symptoms. Safety and comfort measures provided, bed locked and in lowest position, side rails up for safety. VS to order and Awaiting admit to MEDICINE.

## 2024-05-19 NOTE — ED ADULT NURSE REASSESSMENT NOTE - NS ED NURSE REASSESS COMMENT FT1
PT is resting comfortably in bed, breathing unlabored on room air, and speaking in complete sentences. Updated PT on plan of care. PT admitted to medicine, awaiting a bed. Safety and comfort maintained. Call bell within reach.

## 2024-05-19 NOTE — CONSULT NOTE ADULT - ATTENDING COMMENTS
Seen with ID fellow. Agree with assessment and plan as above.  89 M with malaise, fatigue, UTI, possible pneumonia.   Would check ct chest w/o contrast to better evaluate for pneumonia.  Agree with broad spectrum zosyn for now to cover for urine and pneumonia until sensi it back.  Okay to continue azithro for possible pneumonia.  Check urine legionella, strep ag.  ID will follow  trend cbc/cmp    Payal Wolfe MD  [phone] (pager)  [phone] (office)

## 2024-05-19 NOTE — H&P ADULT - CONVERSATION DETAILS
*** Advance directive /  goals of care discussion      I had a long discussion with patient ( and family) about patient's overall diagnosis, expected prognosis, and potential complications.       Discussed treatment options, comfort care / hospice as appropriate, and all other potential options of care.         Discussed risks, benefits, and alternatives of treatment as well.          Opportunity given for and all questions answered.            Reviewed available advance directives as available >  none available      At this time patient to be > full code, with continuation of current medical therapy.     Goal is for pt to return > home and follow up as outpatient with current doctors      will continue to discuss GOC with pt and family and update plan as needed.     Patient's family have my contact information and will contact me with questions.     Additional time spent on Goals of care: 22 min.

## 2024-05-19 NOTE — ED ADULT NURSE REASSESSMENT NOTE - NS ED NURSE REASSESS COMMENT FT1
Report received from HARVEY Giles. PT is resting comfortably in bed, breathing unlabored on room air, and speaking in complete sentences. PT removed off cardiac monitor as PT is admitted to medicine and awaiting a bed. Safety and comfort maintained. Call bell within reach.

## 2024-05-19 NOTE — H&P ADULT - HISTORY OF PRESENT ILLNESS
>>>>>>Medical Attending Initial / Admission note<<<<<<  -------------------------------------------------------------------------------  CHIEF COMPLAINT & HISTORY OF PRESENT ILLNESS:      Patient is a 89yo Male with past medical history of HTN, prostate cancer post RT, prior syncope and ?Afib on coumadin, sent in by PMD for failed oral antibiotics treatment as outpatient for pneumonia ..  patient states for past two weeks patient has had weakness, chills, and burning with urination  ( which has now improved) , no fever, no couhg.. patient started on Augmentin which did not help much , patient sent in for IV antibiotics..  in ED patient found with LLL pneumonia and UTI, admitted for further management  patient mildly hypoxemic on room air ( low 90s), + some SOB ..   patient lives alone, has been recently needing to use his cane due to weakness..     --------------------------------------------------------------------------------  PAST MEDICAL / SURGICAL  HISTORY:    HTN  prior syncope  AF on coumadin  prostate cancer post RT  cyst on spine removed    --------------------------------------------------------------------------------  FAMILY HISTORY:    mother: metastatic cancer, possible thyroid cancer     --------------------------------------------------------------------------------  SOCIAL HISTORY:  Alcohol: None reported  Smoking: past smoker, quit 1985    patient  lives alone, one son in [place]  otherwise fairly independent     --------------------------------------------------------------------------------  ALLERGIES:    [As sarai, reviewed]    --------------------------------------------------------------------------------  MEDICATIONS:   [As sarai, reviewed]    --------------------------------------------------------------------------------  REVIEW OF SYSTEM:    GEN: no fever, no chills now, no pain  RESP: no significant SOB at rest , no cough, no sputum  CVS: no chest pain, no palpitations, no edema  GI: no abdominal pain, no nausea, no vomiting, no constipation, no diarrhea  : no dysuria, no frequency, no hematuria  Neuro: no headache, no dizziness  PSYCH: no anxiety, no depression  Derm : no itching, no rash    --------------------------------------------------------------------------------  VITAL SIGNS:  Height (cm): 188  Weight (kg): 104.3  BMI (kg/m2): 29.5  Vital Signs Last 24 HrsT(C): 37.6 (05-19-24 @ 07:35)  T(F): 99.7 (05-19-24 @ 07:35), Max: 99.7 (05-19-24 @ 07:35)  HR: 86 (05-19-24 @ 07:35) (69 - 88)  BP: 110/76 (05-19-24 @ 07:35)  RR: 20 (05-19-24 @ 07:35) (20 - 25)  SpO2: 94% (05-19-24 @ 07:35) (94% - 99%)      --------------------------------------------------------------------------------  PHYSICAL EXAM:    GEN: A&O X 3 , NAD , comfortable, pleasant, calm  HEENT: NCAT, PERRL, MMM, hearing intact  Neck: supple , no JVD  CVS: S1S2 , regular , No M/R/G appreciated  PULM:LLL mild wheezing and rhonchi   ABD.: soft. non tender, non distended,  bowel sounds present  Extrem: intact pulses , no edema   Derm: No rash , no ecchymoses  PSYCH : normal mood,  no delusion not anxious    --------------------------------------------------------------------------------  LAB AND IMAGING:   [As sarai, salome]    --------------------------------------------------------------------------------  ASSESSMENT AND PLAN:   [As bellow]    --------------------  Case discussed with patient, PMD/ cardio  ___________________________  H. ISHA Deshpande.  Pager: [phone]  05-19-24     ( Note written / Date of service 05-19-24 ( This is certified to be the same as "ENTERED" date above ( for billing Purposes )))

## 2024-05-19 NOTE — H&P ADULT - ASSESSMENT
Patient is a 89yo Male with past medical history of HTN, prostate cancer post RT, prior syncope and ?Afib on coumadin, sent in by PMD for failed oral antibiotics treatment as outpatient for pneumonia ..  patient states for past two weeks patient has had weakness, chills, and burning with urination  ( which has now improved) , no fever, no couhg.. patient started on Augmentin which did not help much , patient sent in for IV antibiotics..  in ED patient found with LLL pneumonia and UTI, admitted for further management  patient mildly hypoxemic on room air ( low 90s), + some SOB ..   patient lives alone, has been recently needing to use his cane due to weakness..

## 2024-05-19 NOTE — CONSULT NOTE ADULT - SUBJECTIVE AND OBJECTIVE BOX
Patient is a 89y old  Male who presents with a chief complaint of pneumonia (19 May 2024 09:45)    HPI:    88yo Male with past medical history of HTN, prostate cancer post RT, prior syncope and ?Afib on coumadin, sent in by PMD for failed oral antibiotics treatment as outpatient for pneumonia. For past two weeks patient has had weakness, chills, and burning with urination which has now improved. Patient was started on Augmentin which did not help much, patient sent in for IV antibiotics.    ED; Afebrile, VSS.  WBC 12 ( 16 5/17). received ceftriaxone and Azithromycin.    UA >998 WBC  Limited RVP negative      Recent Ucx 5/7 with E coli resistant to ceftriaxone       prior hospital charts reviewed [  ]  primary team notes reviewed [ x ]  other consultant notes reviewed [  ]    PAST MEDICAL & SURGICAL HISTORY:      Allergies  No Known Allergies    ANTIMICROBIALS (past 90 days)  MEDICATIONS  (STANDING):  azithromycin  IVPB   255 mL/Hr IV Intermittent (05-18-24 @ 23:41)    cefTRIAXone   IVPB   100 mL/Hr IV Intermittent (05-18-24 @ 22:57)          MEDICATIONS  (STANDING):  acetaminophen     Tablet .. 650 every 6 hours PRN  aluminum hydroxide/magnesium hydroxide/simethicone Suspension 30 every 4 hours PRN  melatonin 3 at bedtime PRN  metoprolol succinate ER 50 daily  ondansetron Injectable 4 every 8 hours PRN  warfarin 2 once    SOCIAL HISTORY:       FAMILY HISTORY:    REVIEW OF SYSTEMS  [  ] ROS unobtainable because:    [  ] All other systems negative except as noted below:	    Constitutional:  [ ] fever [ ] chills  [ ] weight loss  [ ] weakness  Skin:  [ ] rash [ ] phlebitis	  Eyes: [ ] icterus [ ] pain  [ ] discharge	  ENMT: [ ] sore throat  [ ] thrush [ ] ulcers [ ] exudates  Respiratory: [ ] dyspnea [ ] hemoptysis [ ] cough [ ] sputum	  Cardiovascular:  [ ] chest pain [ ] palpitations [ ] edema	  Gastrointestinal:  [ ] nausea [ ] vomiting [ ] diarrhea [ ] constipation [ ] pain	  Genitourinary:  [ ] dysuria [ ] frequency [ ] hematuria [ ] discharge [ ] flank pain  [ ] incontinence  Musculoskeletal:  [ ] myalgias [ ] arthralgias [ ] arthritis  [ ] back pain  Neurological:  [ ] headache [ ] seizures  [ ] confusion/altered mental status  Psychiatric:  [ ] anxiety [ ] depression	  Hematology/Lymphatics:  [ ] lymphadenopathy  Endocrine:  [ ] adrenal [ ] thyroid  Allergic/Immunologic:	 [ ] transplant [ ] seasonal    Vital Signs Last 24 Hrs  T(F): 99.3 (05-19-24 @ 10:55), Max: 99.7 (05-19-24 @ 07:35)  Vital Signs Last 24 Hrs  HR: 84 (05-19-24 @ 10:55) (69 - 88)  BP: 110/74 (05-19-24 @ 10:55) (101/71 - 111/73)  RR: 20 (05-19-24 @ 10:55)  SpO2: 94% (05-19-24 @ 10:55) (94% - 99%)  Wt(kg): --    PHYSICAL EXAM:                            13.2   12.23 )-----------( 195      ( 18 May 2024 22:57 )             40.2   05-18    137  |  101  |  22  ----------------------------<  159<H>  4.3   |  24  |  0.86    Ca    9.1      18 May 2024 22:57    TPro  6.6  /  Alb  3.5  /  TBili  0.9  /  DBili  x   /  AST  41<H>  /  ALT  29  /  AlkPhos  83  05-18                  RADIOLOGY:  imaging below personally reviewed and agree with findings    < from: Xray Chest 1 View- PORTABLE-Urgent (05.18.24 @ 23:21) >  NTERPRETATION:  EXAMINATION: XR CHEST URGENT    CLINICAL INDICATION: Sepsis    TECHNIQUE: Single frontal, portable view of the chest was obtained.    COMPARISON: None.    FINDINGS:      The heart is not accurately assessed in this AP projection.  Left lower lobe opacity, which may represent infection versus atelectasis.  There is no pneumothorax.  No acute bony abnormality.    IMPRESSION:  Left lower lobe opacity, which may represent infection versus atelectasis.     Patient is a 89y old  Male who presents with a chief complaint of pneumonia (19 May 2024 09:45)    HPI:    89yo Male with past medical history of HTN, prostate cancer post RT, prior syncope and ?Afib on coumadin, sent in by PMD for failed oral antibiotics treatment as outpatient for UTI/pneumonia. For past two weeks patient has had weakness, chills, and burning with urination which has now improved. Patient was started on Augmentin which did not help much, patient sent in for IV antibiotics. patient reporting generalized weakness and some dysuria. No cough, chest pain, chills, fevers.    ED; Afebrile, VSS.  WBC 12 ( 16 5/17). received ceftriaxone and Azithromycin.    UA >998 WBC  Limited RVP negative      Recent Ucx 5/7 with E coli resistant to ceftriaxone       prior hospital charts reviewed [ x ]  primary team notes reviewed [ x ]  other consultant notes reviewed [  ]    PAST MEDICAL & SURGICAL HISTORY:      Allergies  No Known Allergies    ANTIMICROBIALS (past 90 days)  MEDICATIONS  (STANDING):  azithromycin  IVPB   255 mL/Hr IV Intermittent (05-18-24 @ 23:41)    cefTRIAXone   IVPB   100 mL/Hr IV Intermittent (05-18-24 @ 22:57)          MEDICATIONS  (STANDING):  acetaminophen     Tablet .. 650 every 6 hours PRN  aluminum hydroxide/magnesium hydroxide/simethicone Suspension 30 every 4 hours PRN  melatonin 3 at bedtime PRN  metoprolol succinate ER 50 daily  ondansetron Injectable 4 every 8 hours PRN  warfarin 2 once    SOCIAL HISTORY: Lives at home with family, no tobacco etoh use      FAMILY HISTORY: Non contributory    REVIEW OF SYSTEMS  [  ] ROS unobtainable because:    [x  ] All other systems negative except as noted below:	    Constitutional:  [ ] fever [ ] chills  [ ] weight loss  [x ] weakness  Skin:  [ ] rash [ ] phlebitis	  Eyes: [ ] icterus [ ] pain  [ ] discharge	  ENMT: [ ] sore throat  [ ] thrush [ ] ulcers [ ] exudates  Respiratory: [ ] dyspnea [ ] hemoptysis [ ] cough [ ] sputum	  Cardiovascular:  [ ] chest pain [ ] palpitations [ ] edema	  Gastrointestinal:  [ ] nausea [ ] vomiting [ ] diarrhea [ ] constipation [ ] pain	  Genitourinary:  [ x] dysuria [ ] frequency [ ] hematuria [ ] discharge [ ] flank pain  [ ] incontinence  Musculoskeletal:  [ ] myalgias [ ] arthralgias [ ] arthritis  [ ] back pain  Neurological:  [ ] headache [ ] seizures  [ ] confusion/altered mental status  Psychiatric:  [ ] anxiety [ ] depression	  Hematology/Lymphatics:  [ ] lymphadenopathy  Endocrine:  [ ] adrenal [ ] thyroid  Allergic/Immunologic:	 [ ] transplant [ ] seasonal    Vital Signs Last 24 Hrs  T(F): 99.3 (05-19-24 @ 10:55), Max: 99.7 (05-19-24 @ 07:35)  Vital Signs Last 24 Hrs  HR: 84 (05-19-24 @ 10:55) (69 - 88)  BP: 110/74 (05-19-24 @ 10:55) (101/71 - 111/73)  RR: 20 (05-19-24 @ 10:55)  SpO2: 94% (05-19-24 @ 10:55) (94% - 99%)  Wt(kg): --    PHYSICAL EXAM:    General: Patient in NAD  HEENT: NCAT, EOMI, PERRL, no oral lesions  CV: S1+S2, no m/r/g appreciated   Lungs: rare crackles b/l No respiratory distress,   Abd: Soft, nontender, no guarding, no rebound tenderness, + bowel sounds   : No suprapubic tenderness  Neuro: Alert and oriented to time, place and person. Moves all extremities against gravity.  Ext: No cyanosis, no edema  Skin: No rash, no phlebitis                            13.2   12.23 )-----------( 195      ( 18 May 2024 22:57 )             40.2   05-18    137  |  101  |  22  ----------------------------<  159<H>  4.3   |  24  |  0.86    Ca    9.1      18 May 2024 22:57    TPro  6.6  /  Alb  3.5  /  TBili  0.9  /  DBili  x   /  AST  41<H>  /  ALT  29  /  AlkPhos  83  05-18                  RADIOLOGY:  imaging below personally reviewed and agree with findings    < from: Xray Chest 1 View- PORTABLE-Urgent (05.18.24 @ 23:21) >  NTERPRETATION:  EXAMINATION: XR CHEST URGENT    CLINICAL INDICATION: Sepsis    TECHNIQUE: Single frontal, portable view of the chest was obtained.    COMPARISON: None.    FINDINGS:      The heart is not accurately assessed in this AP projection.  Left lower lobe opacity, which may represent infection versus atelectasis.  There is no pneumothorax.  No acute bony abnormality.    IMPRESSION:  Left lower lobe opacity, which may represent infection versus atelectasis.

## 2024-05-19 NOTE — CONSULT NOTE ADULT - ASSESSMENT
89yo Male with past medical history of HTN, prostate cancer post RT, prior syncope and ?Afib on coumadin, sent in by PMD for failed oral antibiotics treatment as outpatient for pneumonia. For past two weeks patient has had weakness, chills, and burning with urination which has now improved. Patient was started on Augmentin which did not help much, patient sent in for IV antibiotics.    ED; Afebrile, VSS.  WBC 12. Started on ceftriaxone and Azithromycin.    UA >998 WBC  Limited RVP negative    Recent Ucx 5/7 with E coli resistant to ceftriaxone    CXR Left lower lobe opacity, which may represent infection versus atelectasis.      # Concern for pneumonia  # Pyuria  # CA prostate s/p RT    Patient to be seen. 89yo Male with past medical history of HTN, prostate cancer post RT, prior syncope and ?Afib on coumadin, sent in by PMD for failed oral antibiotics treatment as outpatient for pneumonia. For past two weeks patient has had weakness, chills, and burning with urination which has now improved. Patient was started on Augmentin which did not help much, patient sent in for IV antibiotics.    ED; Afebrile, VSS.  WBC 12. Started on ceftriaxone and Azithromycin.    UA >998 WBC  Limited RVP negative    Recent Ucx 5/7 with E coli resistant to ceftriaxone    CXR Left lower lobe opacity, which may represent infection versus atelectasis.      # Concern for pneumonia  # Pyuria  # CA prostate s/p RT    - Would check CT chest non con  - can send urine pneumococcal ag, Legionella antigen  - Stop ceftriaxone, start Zosyn [id number]  - Continue Azithromycin  - follow blood cultures/urine cultures  - continue to monitor clinically, trend CBC, fevers      Case d/w Attending and Primary team.    Eladio Ramirez MD, PGY-4  ID Fellow  [employer] Preferred  After 5pm/weekends call [phone]

## 2024-05-19 NOTE — H&P ADULT - PROBLEM SELECTOR PLAN 1
patient post augmentin as outpatient X ?days  patient post rocephine / zithro in ED  ID consult per PMD request : called  monitor vitals / O2, labs  PT evaluation given weakness   evaluation and safe DC planing   supportive care otherwise

## 2024-05-20 LAB
ALBUMIN SERPL ELPH-MCNC: 3.3 G/DL — SIGNIFICANT CHANGE UP (ref 3.3–5)
ALP SERPL-CCNC: 77 U/L — SIGNIFICANT CHANGE UP (ref 40–120)
ALT FLD-CCNC: 36 U/L — SIGNIFICANT CHANGE UP (ref 10–45)
ANION GAP SERPL CALC-SCNC: 10 MMOL/L — SIGNIFICANT CHANGE UP (ref 5–17)
AST SERPL-CCNC: 36 U/L — SIGNIFICANT CHANGE UP (ref 10–40)
BASOPHILS # BLD AUTO: 0.04 K/UL — SIGNIFICANT CHANGE UP (ref 0–0.2)
BASOPHILS NFR BLD AUTO: 0.4 % — SIGNIFICANT CHANGE UP (ref 0–2)
BILIRUB SERPL-MCNC: 0.6 MG/DL — SIGNIFICANT CHANGE UP (ref 0.2–1.2)
BUN SERPL-MCNC: 17 MG/DL — SIGNIFICANT CHANGE UP (ref 7–23)
CALCIUM SERPL-MCNC: 8.6 MG/DL — SIGNIFICANT CHANGE UP (ref 8.4–10.5)
CHLORIDE SERPL-SCNC: 101 MMOL/L — SIGNIFICANT CHANGE UP (ref 96–108)
CHOLEST SERPL-MCNC: 103 MG/DL — SIGNIFICANT CHANGE UP
CO2 SERPL-SCNC: 27 MMOL/L — SIGNIFICANT CHANGE UP (ref 22–31)
CREAT SERPL-MCNC: 0.74 MG/DL — SIGNIFICANT CHANGE UP (ref 0.5–1.3)
EGFR: 88 ML/MIN/1.73M2 — SIGNIFICANT CHANGE UP
EOSINOPHIL # BLD AUTO: 0.18 K/UL — SIGNIFICANT CHANGE UP (ref 0–0.5)
EOSINOPHIL NFR BLD AUTO: 1.7 % — SIGNIFICANT CHANGE UP (ref 0–6)
GLUCOSE SERPL-MCNC: 121 MG/DL — HIGH (ref 70–99)
HCT VFR BLD CALC: 40.4 % — SIGNIFICANT CHANGE UP (ref 39–50)
HDLC SERPL-MCNC: 34 MG/DL — LOW
HGB BLD-MCNC: 12.9 G/DL — LOW (ref 13–17)
IMM GRANULOCYTES NFR BLD AUTO: 0.6 % — SIGNIFICANT CHANGE UP (ref 0–0.9)
INR BLD: 3.85 RATIO — HIGH (ref 0.85–1.18)
LIPID PNL WITH DIRECT LDL SERPL: 55 MG/DL — SIGNIFICANT CHANGE UP
LYMPHOCYTES # BLD AUTO: 2.2 K/UL — SIGNIFICANT CHANGE UP (ref 1–3.3)
LYMPHOCYTES # BLD AUTO: 20.8 % — SIGNIFICANT CHANGE UP (ref 13–44)
MCHC RBC-ENTMCNC: 31.9 GM/DL — LOW (ref 32–36)
MCHC RBC-ENTMCNC: 32.2 PG — SIGNIFICANT CHANGE UP (ref 27–34)
MCV RBC AUTO: 100.7 FL — HIGH (ref 80–100)
MONOCYTES # BLD AUTO: 0.99 K/UL — HIGH (ref 0–0.9)
MONOCYTES NFR BLD AUTO: 9.3 % — SIGNIFICANT CHANGE UP (ref 2–14)
MRSA PCR RESULT.: SIGNIFICANT CHANGE UP
NEUTROPHILS # BLD AUTO: 7.12 K/UL — SIGNIFICANT CHANGE UP (ref 1.8–7.4)
NEUTROPHILS NFR BLD AUTO: 67.2 % — SIGNIFICANT CHANGE UP (ref 43–77)
NON HDL CHOLESTEROL: 69 MG/DL — SIGNIFICANT CHANGE UP
NRBC # BLD: 0 /100 WBCS — SIGNIFICANT CHANGE UP (ref 0–0)
PLATELET # BLD AUTO: 187 K/UL — SIGNIFICANT CHANGE UP (ref 150–400)
POTASSIUM SERPL-MCNC: 3.8 MMOL/L — SIGNIFICANT CHANGE UP (ref 3.5–5.3)
POTASSIUM SERPL-SCNC: 3.8 MMOL/L — SIGNIFICANT CHANGE UP (ref 3.5–5.3)
PROT SERPL-MCNC: 5.9 G/DL — LOW (ref 6–8.3)
PROTHROM AB SERPL-ACNC: 38.9 SEC — HIGH (ref 9.5–13)
RBC # BLD: 4.01 M/UL — LOW (ref 4.2–5.8)
RBC # FLD: 12.4 % — SIGNIFICANT CHANGE UP (ref 10.3–14.5)
S AUREUS DNA NOSE QL NAA+PROBE: SIGNIFICANT CHANGE UP
S PNEUM AG UR QL: NEGATIVE — SIGNIFICANT CHANGE UP
SODIUM SERPL-SCNC: 138 MMOL/L — SIGNIFICANT CHANGE UP (ref 135–145)
TRIGL SERPL-MCNC: 66 MG/DL — SIGNIFICANT CHANGE UP
WBC # BLD: 10.59 K/UL — HIGH (ref 3.8–10.5)
WBC # FLD AUTO: 10.59 K/UL — HIGH (ref 3.8–10.5)

## 2024-05-20 PROCEDURE — 99232 SBSQ HOSP IP/OBS MODERATE 35: CPT

## 2024-05-20 PROCEDURE — 74183 MRI ABD W/O CNTR FLWD CNTR: CPT | Mod: 26

## 2024-05-20 PROCEDURE — 71250 CT THORAX DX C-: CPT | Mod: 26

## 2024-05-20 RX ORDER — CHLORHEXIDINE GLUCONATE 213 G/1000ML
1 SOLUTION TOPICAL DAILY
Refills: 0 | Status: DISCONTINUED | OUTPATIENT
Start: 2024-05-20 | End: 2024-05-23

## 2024-05-20 RX ORDER — FUROSEMIDE 40 MG
20 TABLET ORAL DAILY
Refills: 0 | Status: DISCONTINUED | OUTPATIENT
Start: 2024-05-20 | End: 2024-05-23

## 2024-05-20 RX ORDER — AZITHROMYCIN 500 MG/1
500 TABLET, FILM COATED ORAL EVERY 24 HOURS
Refills: 0 | Status: DISCONTINUED | OUTPATIENT
Start: 2024-05-20 | End: 2024-05-20

## 2024-05-20 RX ADMIN — PIPERACILLIN AND TAZOBACTAM 25 GRAM(S): 4; .5 INJECTION, POWDER, LYOPHILIZED, FOR SOLUTION INTRAVENOUS at 02:20

## 2024-05-20 RX ADMIN — PIPERACILLIN AND TAZOBACTAM 25 GRAM(S): 4; .5 INJECTION, POWDER, LYOPHILIZED, FOR SOLUTION INTRAVENOUS at 10:52

## 2024-05-20 RX ADMIN — PIPERACILLIN AND TAZOBACTAM 25 GRAM(S): 4; .5 INJECTION, POWDER, LYOPHILIZED, FOR SOLUTION INTRAVENOUS at 17:24

## 2024-05-20 RX ADMIN — CHLORHEXIDINE GLUCONATE 1 APPLICATION(S): 213 SOLUTION TOPICAL at 17:25

## 2024-05-20 RX ADMIN — Medication 20 MILLIGRAM(S): at 10:51

## 2024-05-20 RX ADMIN — AZITHROMYCIN 255 MILLIGRAM(S): 500 TABLET, FILM COATED ORAL at 01:02

## 2024-05-20 RX ADMIN — Medication 50 MILLIGRAM(S): at 05:24

## 2024-05-20 RX ADMIN — Medication 1 TABLET(S): at 10:51

## 2024-05-20 NOTE — PROGRESS NOTE ADULT - ASSESSMENT
_________________________________________________________________________________________  ========>>  M E D I C A L   A T T E N D I N G    F O L L O W  U P  N O T E  <<=========  -----------------------------------------------------------------------------------------------------    - Patient seen and examined by me earlier today.   - In summary,  NORMA DINH is a 89y year old man admitted with UTI   - Patient today overall doing ok, comfortable, eating OK.     patient overall feeling better, no cough , no SOB.. .     ==================>> REVIEW OF SYSTEM <<=================    GEN: no fever, no chills, no pain  RESP: no SOB, no cough, no sputum  CVS: no chest pain, no palpitations  GI: no abdominal pain, no nausea, no constipation, no diarrhea  : no dysuria, no frequency  Neuro: no headache, no dizziness    ==================>> PHYSICAL EXAM <<=================    GEN: A&O X 3 , NAD , comfortable, pleasant, calm in bed.. encouraged out of bed to chair with assistance as needed.   HEENT: NCAT, PERRL, MMM, hearing intact  CVS: S1S2 , regular , No M/R/G appreciated  PULM: + bilateral mild wheezing !! , no rhonchi   ABD.: soft. non tender, non distended,  bowel sounds present  Extrem: intact pulses , no edema           ( Note written / Date of service 05-20-24 ( This is certified to be the same as "ENTERED" date above ( for billing purposes)))    ==================>> MEDICATIONS <<====================    MEDICATIONS  (STANDING):  azithromycin  IVPB 500 milliGRAM(s) IV Intermittent every 24 hours  chlorhexidine 2% Cloths 1 Application(s) Topical daily  furosemide    Tablet 20 milliGRAM(s) Oral daily  metoprolol succinate ER 50 milliGRAM(s) Oral daily  multivitamin 1 Tablet(s) Oral daily  piperacillin/tazobactam IVPB.. 3.375 Gram(s) IV Intermittent every 8 hours    MEDICATIONS  (PRN):  acetaminophen     Tablet .. 650 milliGRAM(s) Oral every 6 hours PRN Temp greater or equal to 38C (100.4F), Mild Pain (1 - 3)  aluminum hydroxide/magnesium hydroxide/simethicone Suspension 30 milliLiter(s) Oral every 4 hours PRN Dyspepsia  melatonin 3 milliGRAM(s) Oral at bedtime PRN Insomnia  ondansetron Injectable 4 milliGRAM(s) IV Push every 8 hours PRN Nausea and/or Vomiting    ___________  Active diet:  Diet, Regular  ___________________    ==================>> VITAL SIGNS <<==================    T(C): 36.8 (05-20-24 @ 10:26), Max: 37.3 (05-19-24 @ 18:00)  HR: 75 (05-20-24 @ 10:26) (73 - 100)  BP: 137/84 (05-20-24 @ 10:26) (97/58 - 137/84)  BP(mean): 85 (05-19-24 @ 21:30)  RR: 18 (05-20-24 @ 10:26) (18 - 19)  SpO2: 94% (05-20-24 @ 10:26) (93% - 95%)      ==================>> LAB AND IMAGING <<==================                        12.9   10.59 )-----------( 187      ( 20 May 2024 06:52 )             40.4     WBC count:   10.59 <<== ,  12.23 <<==        05-20    138  |  101  |  17  ----------------------------<  121<H>  3.8   |  27  |  0.74    Ca    8.6      20 May 2024 06:52    TPro  5.9<L>  /  Alb  3.3  /  TBili  0.6  /  DBili  x   /  AST  36  /  ALT  36  /  AlkPhos  77  05-20    PT/INR - ( 20 May 2024 06:52 )   PT: 38.9 sec;   INR: 3.85 ratio    PTT - ( 18 May 2024 22:57 )  PTT:38.7 sec              I N R :  3.85  <<==, 3.33  <<==, 2.93  <<==     Urinalysis:  05-19-24 @ 02:37  Leuk. Est.: Large  Nirite: Positive  WBC: >998  Blood: Large     salt Crystal: --     calcium crystal: --     Bili: Negative     cast: 7  color: Dark Yellow  Bacteria: Few  Epith. cell: 2  Yeast: --     Ketone: Negative     Protein: 300     Glucose: Negative     sperm: --     Spec.Gravity: 1.019    Lipid profile:  (05-20-24)     Total: 103     LDL  : (p)     HDL  :34     TG   :66     < from: CT Chest No Cont (05.20.24 @ 06:16) >  IMPRESSION:.  Partially imaged 6.5 cm left renal cystic mass with suspected soft tissue   wall thickening. This mass is not adequately assessed on this   examination. Dedicated or MRI of the abdomen is recommended to further   assess as infection/abscess and renal malignancy are diagnostic  considerations.  No evidence of pneumonia.  Small left pleural effusion.  < end of copied text >    Echo pending    ___________________________________________________________________________________  ===============>>  A S S E S S M E N T   A N D   P L A N <<===============  ------------------------------------------------------------------------------------------    · Assessment	  Patient is a 89yo Male with past medical history of HTN, prostate cancer post RT, prior syncope and ?Afib on coumadin, sent in by PMD for failed oral antibiotics treatment as outpatient for pneumonia ..  patient states for past two weeks patient has had weakness, chills, and burning with urination  ( which has now improved) , no fever, no couhg.. patient started on Augmentin which did not help much , patient sent in for IV antibiotics..  in ED patient found with LLL pneumonia and UTI, admitted for further management  patient mildly hypoxemic on room air ( low 90s), + some SOB ..   patient lives alone, has been recently needing to use his cane due to weakness..         Problem/Plan - 1:  ·  Problem: possible LLL pneumonia >> rule out by CT scan !      patient post course of antibiotics prior to admission      patient with pleural effusion on CT scan      diuretics per cardio as ordered      echo pending   ID following   monitor vitals / O2, labs    Problem/Plan - 2:  ·  Problem: Acute UTI.   ·  Plan: continue antibiotics per ID  follow cultures   encourage hydration  PT evaluation given weakness   evaluation and safe DC planing   supportive care otherwise.    ## renal cystic mass seen on CT     MRI ordered : discussed with patient..       evaluation pending above     Problem/Plan - 3:  ·  Problem: Chronic atrial fibrillation.   ·  Plan: metoprolol  coumadin per INR >> hold coumadin tonight   monitor vitals  cardio on board     Problem/Plan - 4:  ·  Problem: Hypertension.   ·  Plan: Continue home medications and monitor.    -GI/DVT Prophylaxis per protocol.    --------------------------------------------  Case discussed with patient..   Education given on findings and plan of care  ___________________________  H. ISHA Deshpande.  Pager: [phone]

## 2024-05-20 NOTE — PROGRESS NOTE ADULT - SUBJECTIVE AND OBJECTIVE BOX
89yPatient is a 89y old  Male who presents with a chief complaint of UTI (20 May 2024 11:47)      Interval history:  Feeling better.  Still some slighty dysuria  Apprec cards input          Antimicrobials:    azithromycin  IVPB 500 milliGRAM(s) IV Intermittent every 24 hours  piperacillin/tazobactam IVPB.. 3.375 Gram(s) IV Intermittent every 8 hours    MEDICATIONS  (STANDING):  acetaminophen     Tablet .. 650 every 6 hours PRN  aluminum hydroxide/magnesium hydroxide/simethicone Suspension 30 every 4 hours PRN  furosemide    Tablet 20 daily  melatonin 3 at bedtime PRN  metoprolol succinate ER 50 daily  ondansetron Injectable 4 every 8 hours PRN        Vital Signs Last 24 Hrs  T(C): 37.1 (05-20-24 @ 13:48), Max: 37.3 (05-19-24 @ 18:00)  T(F): 98.8 (05-20-24 @ 13:48), Max: 99.2 (05-19-24 @ 18:00)  HR: 91 (05-20-24 @ 13:48) (73 - 100)  BP: 121/79 (05-20-24 @ 13:48) (106/66 - 137/84)  BP(mean): 85 (05-19-24 @ 21:30) (85 - 91)  RR: 18 (05-20-24 @ 13:48) (18 - 19)  SpO2: 94% (05-20-24 @ 13:48) (94% - 95%)      PHYSICAL EXAM:    General: Patient in NAD  HEENT: NCAT, EOMI, PERRL, no oral lesions  CV: S1+S2, no m/r/g appreciated   Lungs: rare crackles b/l No respiratory distress,   Abd: Soft, nontender, no guarding, no rebound tenderness, + bowel sounds   : No suprapubic tenderness  Neuro: Alert and oriented to time, place and person. Moves all extremities against gravity.  Ext: No cyanosis, no edema  Skin: No rash, no phlebitis                        12.9   10.59 )-----------( 187      ( 20 May 2024 06:52 )             40.4   05-20    138  |  101  |  17  ----------------------------<  121<H>  3.8   |  27  |  0.74    Ca    8.6      20 May 2024 06:52    TPro  5.9<L>  /  Alb  3.3  /  TBili  0.6  /  DBili  x   /  AST  36  /  ALT  36  /  AlkPhos  77  05-20      LIVER FUNCTIONS - ( 20 May 2024 06:52 )  Alb: 3.3 g/dL / Pro: 5.9 g/dL / ALK PHOS: 77 U/L / ALT: 36 U/L / AST: 36 U/L / GGT: x             RECENT CULTURES:    Culture - Blood (collected 18 May 2024 22:58)  Source: .Blood Blood-Peripheral  Preliminary Report (20 May 2024 05:01):    No growth at 24 hours    Culture - Blood (collected 18 May 2024 22:58)  Source: .Blood Blood-Peripheral  Preliminary Report (20 May 2024 05:01):    No growth at 24 hours    Urine culture pending      Radiology:  < from: CT Chest No Cont (05.20.24 @ 06:16) >  IMPRESSION:.    Partially imaged 6.5 cm left renal cystic mass with suspected soft tissue   wall thickening. This mass is not adequately assessed on this   examination. Dedicated or MRI of the abdomen is recommended to further   assess as infection/abscess and renal malignancy are diagnostic   considerations.    No evidence of pneumonia.    Small left pleural effusion.    < end of copied text >   89yPatient is a 89y old  Male who presents with a chief complaint of UTI (20 May 2024 11:47)      Interval history:  Feeling better.  Still some slighty dysuria  Apprec cards input          Antimicrobials:    azithromycin  IVPB 500 milliGRAM(s) IV Intermittent every 24 hours  piperacillin/tazobactam IVPB.. 3.375 Gram(s) IV Intermittent every 8 hours    MEDICATIONS  (STANDING):  acetaminophen     Tablet .. 650 every 6 hours PRN  aluminum hydroxide/magnesium hydroxide/simethicone Suspension 30 every 4 hours PRN  furosemide    Tablet 20 daily  melatonin 3 at bedtime PRN  metoprolol succinate ER 50 daily  ondansetron Injectable 4 every 8 hours PRN        Vital Signs Last 24 Hrs  T(C): 37.1 (05-20-24 @ 13:48), Max: 37.3 (05-19-24 @ 18:00)  T(F): 98.8 (05-20-24 @ 13:48), Max: 99.2 (05-19-24 @ 18:00)  HR: 91 (05-20-24 @ 13:48) (73 - 100)  BP: 121/79 (05-20-24 @ 13:48) (106/66 - 137/84)  BP(mean): 85 (05-19-24 @ 21:30) (85 - 91)  RR: 18 (05-20-24 @ 13:48) (18 - 19)  SpO2: 94% (05-20-24 @ 13:48) (94% - 95%)      PHYSICAL EXAM:    General: Patient in NAD  HEENT: NCAT, EOMI, PERRL, no oral lesions  CV: S1+S2, no m/r/g appreciated   Lungs: wheeze b/l audible anteriorly  Abd: Soft, nontender, no guarding, no rebound tenderness, + bowel sounds   : No suprapubic tenderness  Neuro: Alert and oriented to time, place and person. Moves all extremities against gravity.  Ext: No cyanosis, no edema  Skin: No rash, no phlebitis                        12.9   10.59 )-----------( 187      ( 20 May 2024 06:52 )             40.4   05-20    138  |  101  |  17  ----------------------------<  121<H>  3.8   |  27  |  0.74    Ca    8.6      20 May 2024 06:52    TPro  5.9<L>  /  Alb  3.3  /  TBili  0.6  /  DBili  x   /  AST  36  /  ALT  36  /  AlkPhos  77  05-20      LIVER FUNCTIONS - ( 20 May 2024 06:52 )  Alb: 3.3 g/dL / Pro: 5.9 g/dL / ALK PHOS: 77 U/L / ALT: 36 U/L / AST: 36 U/L / GGT: x             RECENT CULTURES:    Culture - Blood (collected 18 May 2024 22:58)  Source: .Blood Blood-Peripheral  Preliminary Report (20 May 2024 05:01):    No growth at 24 hours    Culture - Blood (collected 18 May 2024 22:58)  Source: .Blood Blood-Peripheral  Preliminary Report (20 May 2024 05:01):    No growth at 24 hours    Urine culture pending      Radiology:  < from: CT Chest No Cont (05.20.24 @ 06:16) >  IMPRESSION:.    Partially imaged 6.5 cm left renal cystic mass with suspected soft tissue   wall thickening. This mass is not adequately assessed on this   examination. Dedicated or MRI of the abdomen is recommended to further   assess as infection/abscess and renal malignancy are diagnostic   considerations.    No evidence of pneumonia.    Small left pleural effusion.    < end of copied text >

## 2024-05-20 NOTE — PHYSICAL THERAPY INITIAL EVALUATION ADULT - PERTINENT HX OF CURRENT PROBLEM, REHAB EVAL
Pt is a 89yo M PMHx prostate cancer s/p treatment, HTN, A-fib on warfarin, former smoker, p/w  PNA seen on outpatient chest x-ray.  Patient reports dysuria 1 week ago, was prescribed unspecified antibiotic by PMD and urinary symptoms have since completely resolved.  However, patient since developed progressively worsening shortness of breath with orthopnea.  CXR: Left lower lobe opacity, which may represent infection versus atelectasis.  CT Chest: Partially imaged 6.5 cm left renal cystic mass with suspected soft tissue wall thickening. This mass is not adequately assessed on this examination. No evidence of pneumonia. Small left pleural effusion.

## 2024-05-20 NOTE — PATIENT PROFILE ADULT - FALL HARM RISK - HARM RISK INTERVENTIONS
Assistance with ambulation/Assistance OOB with selected safe patient handling equipment/Communicate Risk of Fall with Harm to all staff/Discuss with provider need for PT consult/Monitor gait and stability/Provide patient with walking aids - walker, cane, crutches/Reinforce activity limits and safety measures with patient and family/Tailored Fall Risk Interventions/Use of alarms - bed, chair and/or voice tab/Visual Cue: Yellow wristband and red socks/Bed in lowest position, wheels locked, appropriate side rails in place/Call bell, personal items and telephone in reach/Instruct patient to call for assistance before getting out of bed or chair/Non-slip footwear when patient is out of bed/[place] to call system/Physically safe environment - no spills, clutter or unnecessary equipment/Purposeful Proactive Rounding/Room/bathroom lighting operational, light cord in reach

## 2024-05-20 NOTE — PHYSICAL THERAPY INITIAL EVALUATION ADULT - NSPTDISCHREC_GEN_A_CORE
ANTONI; If pt d/c home, pt would require Home PT and assist/supervision with ALL functional mobility and ADLs./Sub-acute Rehab

## 2024-05-20 NOTE — PHYSICAL THERAPY INITIAL EVALUATION ADULT - ACTIVE RANGE OF MOTION EXAMINATION, REHAB EVAL
Left UE Active ROM was WNL (within normal limits)/bilateral lower extremity Active ROM was WNL (within normal limits)/Right UE Active ROM was WFL (within functional limits)

## 2024-05-20 NOTE — PROGRESS NOTE ADULT - ASSESSMENT
89yo Male with past medical history of HTN, prostate cancer post RT, prior syncope and ?Afib on coumadin, sent in by PMD for failed oral antibiotics treatment as outpatient for pneumonia. For past two weeks patient has had weakness, chills, and burning with urination which has now improved. Patient was started on Augmentin which did not help much, patient sent in for IV antibiotics.    ED; Afebrile, VSS.  WBC 12. Started on ceftriaxone and Azithromycin.    UA >998 WBC  Limited RVP negative    Recent Ucx 5/7 with E coli resistant to ceftriaxone    CXR Left lower lobe opacity, which may represent infection versus atelectasis.  CT chest - no pneumonia noted      #UTI    RECs:  - Stop azithro - no pneumonia noted.  - Cont Zosyn [id number] pending urine culture results  - follow blood cultures/urine cultures  - continue to monitor clinically, trend CBC, fevers  - apprec cardiology input      Payal Wolfe MD  [phone] (pager)  [phone] (office) .

## 2024-05-20 NOTE — CONSULT NOTE ADULT - SUBJECTIVE AND OBJECTIVE BOX
CHIEF COMPLAINT: sob, weakness    HISTORY OF PRESENT ILLNESS:  88yo Male with past medical history of HTN, prostate cancer post RT, prior syncope and Afib on coumadin, sent in by PMD for failed oral antibiotics treatment as outpatient for pneumonia ..  patient states for past two weeks patient has had weakness, chills, and burning with urination  ( which has now improved) , no fever, no couhg.. patient started on Augmentin which did not help much , patient sent in for IV antibiotics..  in ED patient found with LLL pneumonia and UTI, admitted for further management  patient mildly hypoxemic on room air ( low 90s), + some SOB ..   patient lives alone, has been recently needing to use his cane due to weakness..       Allergies    No Known Allergies    Intolerances    	    MEDICATIONS:  metoprolol succinate ER 50 milliGRAM(s) Oral daily    azithromycin  IVPB 500 milliGRAM(s) IV Intermittent every 24 hours  piperacillin/tazobactam IVPB.. 3.375 Gram(s) IV Intermittent every 8 hours      acetaminophen     Tablet .. 650 milliGRAM(s) Oral every 6 hours PRN  melatonin 3 milliGRAM(s) Oral at bedtime PRN  ondansetron Injectable 4 milliGRAM(s) IV Push every 8 hours PRN    aluminum hydroxide/magnesium hydroxide/simethicone Suspension 30 milliLiter(s) Oral every 4 hours PRN      multivitamin 1 Tablet(s) Oral daily      PAST MEDICAL & SURGICAL HISTORY:      FAMILY HISTORY:      SOCIAL HISTORY:    non smoker. indep in adl      REVIEW OF SYSTEMS:  See HPI, otherwise complete 10 point review of systems negative    [ ] All others negative	      PHYSICAL EXAM:  T(C): 36.3 (05-20-24 @ 04:30), Max: 37.4 (05-19-24 @ 10:55)  HR: 75 (05-20-24 @ 04:30) (75 - 100)  BP: 131/80 (05-20-24 @ 04:30) (97/58 - 131/80)  RR: 18 (05-20-24 @ 04:30) (18 - 20)  SpO2: 94% (05-20-24 @ 04:30) (93% - 94%)  Wt(kg): --  I&O's Summary      Appearance: No Acute Distress	  HEENT:  Normal oral mucosa, PERRL, EOMI	  Cardiovascular: Normal S1 S2, No JVD, No murmurs/rubs/gallops  Respiratory: Lungs clear to auscultation bilaterally  Gastrointestinal:  Soft, Non-tender, + BS	  Skin: No rashes, No ecchymoses, No cyanosis	  Neurologic: Non-focal  Extremities: No clubbing, cyanosis or edema  Vascular: Peripheral pulses palpable 2+ bilaterally  Psychiatry: A & O x 3, Mood & affect appropriate    Laboratory Data:	 	    CBC Full  -  ( 20 May 2024 06:52 )  WBC Count : 10.59 K/uL  Hemoglobin : 12.9 g/dL  Hematocrit : 40.4 %  Platelet Count - Automated : 187 K/uL  Mean Cell Volume : 100.7 fl  Mean Cell Hemoglobin : 32.2 pg  Mean Cell Hemoglobin Concentration : 31.9 gm/dL  Auto Neutrophil # : 7.12 K/uL  Auto Lymphocyte # : 2.20 K/uL  Auto Monocyte # : 0.99 K/uL  Auto Eosinophil # : 0.18 K/uL  Auto Basophil # : 0.04 K/uL  Auto Neutrophil % : 67.2 %  Auto Lymphocyte % : 20.8 %  Auto Monocyte % : 9.3 %  Auto Eosinophil % : 1.7 %  Auto Basophil % : 0.4 %    05-20    138  |  101  |  17  ----------------------------<  121<H>  3.8   |  27  |  0.74  05-18    137  |  101  |  22  ----------------------------<  159<H>  4.3   |  24  |  0.86    Ca    8.6      20 May 2024 06:52  Ca    9.1      18 May 2024 22:57    TPro  5.9<L>  /  Alb  3.3  /  TBili  0.6  /  DBili  x   /  AST  36  /  ALT  36  /  AlkPhos  77  05-20  TPro  6.6  /  Alb  3.5  /  TBili  0.9  /  DBili  x   /  AST  41<H>  /  ALT  29  /  AlkPhos  83  05-18      proBNP:   Lipid Profile:   HgA1c:   TSH:       CARDIAC MARKERS:            Interpretation of Telemetry: 	    ECG:  	  RADIOLOGY:  OTHER: 	    PREVIOUS DIAGNOSTIC TESTING:    [ ] Echocardiogram:  [ ] Catheterization:  [ ] Stress Test:  	    Partially imaged 6.5 cm left renal cystic mass with suspected soft tissue   wall thickening. This mass is not adequately assessed on this   examination. Dedicated or MRI of the abdomen is recommended to further   assess as infection/abscess and renal malignancy are diagnostic   considerations.    No evidence of pneumonia.    Small left pleural effusion.      Assessment:  88yo Male with past medical history of HTN, prostate cancer post RT, prior syncope and Afib on coumadin, sent in by PMD for failed oral antibiotics treatment as outpatient for UTI    Recs:  cardiac stable  no e/o acs  small left pleural effusion and mildly short of breath - start lasix 20mg po qd and obtain echo  coum per INR. cw rate control meds  ID follow up for UTI   consult for renal mass  will follow        Greater than 60 minutes spent on total encounter; more than 50% of the visit was spent counseling and/or coordinating care by the attending physician.   	  Urbano Zamudio MD   Cardiovascular Diseases  [phone]

## 2024-05-20 NOTE — PHYSICAL THERAPY INITIAL EVALUATION ADULT - ADDITIONAL COMMENTS
pt resides in a private house alone with 5 stairs to enter and 1st floor set-up once inside. pt ambulates with a cane and rolling walker and independent with all ADLs.

## 2024-05-20 NOTE — PATIENT PROFILE ADULT - NSPROPOAURINARYCATHETER_GEN_A_NUR
no Initiate Treatment: Clindamycin 1% gel AAA of face BID PRN for flares Detail Level: Zone Plan: Pt on testosterone supplementation - cannot do marlon due to drug interaction with triamterene.  But she plans to decrease the testosterone soon

## 2024-05-21 ENCOUNTER — TRANSCRIPTION ENCOUNTER (OUTPATIENT)
Age: 89
End: 2024-05-21

## 2024-05-21 LAB
ANION GAP SERPL CALC-SCNC: 16 MMOL/L — SIGNIFICANT CHANGE UP (ref 5–17)
BUN SERPL-MCNC: 16 MG/DL — SIGNIFICANT CHANGE UP (ref 7–23)
CALCIUM SERPL-MCNC: 9.2 MG/DL — SIGNIFICANT CHANGE UP (ref 8.4–10.5)
CHLORIDE SERPL-SCNC: 100 MMOL/L — SIGNIFICANT CHANGE UP (ref 96–108)
CO2 SERPL-SCNC: 25 MMOL/L — SIGNIFICANT CHANGE UP (ref 22–31)
CREAT SERPL-MCNC: 0.86 MG/DL — SIGNIFICANT CHANGE UP (ref 0.5–1.3)
EGFR: 84 ML/MIN/1.73M2 — SIGNIFICANT CHANGE UP
GLUCOSE SERPL-MCNC: 116 MG/DL — HIGH (ref 70–99)
HCT VFR BLD CALC: 39 % — SIGNIFICANT CHANGE UP (ref 39–50)
HGB BLD-MCNC: 13 G/DL — SIGNIFICANT CHANGE UP (ref 13–17)
INR BLD: 3.77 RATIO — HIGH (ref 0.85–1.18)
LEGIONELLA AG UR QL: NEGATIVE — SIGNIFICANT CHANGE UP
MAGNESIUM SERPL-MCNC: 2 MG/DL — SIGNIFICANT CHANGE UP (ref 1.6–2.6)
MCHC RBC-ENTMCNC: 32.4 PG — SIGNIFICANT CHANGE UP (ref 27–34)
MCHC RBC-ENTMCNC: 33.3 GM/DL — SIGNIFICANT CHANGE UP (ref 32–36)
MCV RBC AUTO: 97.3 FL — SIGNIFICANT CHANGE UP (ref 80–100)
NRBC # BLD: 0 /100 WBCS — SIGNIFICANT CHANGE UP (ref 0–0)
PLATELET # BLD AUTO: 181 K/UL — SIGNIFICANT CHANGE UP (ref 150–400)
POTASSIUM SERPL-MCNC: 3.7 MMOL/L — SIGNIFICANT CHANGE UP (ref 3.5–5.3)
POTASSIUM SERPL-SCNC: 3.7 MMOL/L — SIGNIFICANT CHANGE UP (ref 3.5–5.3)
PROTHROM AB SERPL-ACNC: 39.9 SEC — HIGH (ref 9.5–13)
RBC # BLD: 4.01 M/UL — LOW (ref 4.2–5.8)
RBC # FLD: 12.2 % — SIGNIFICANT CHANGE UP (ref 10.3–14.5)
SODIUM SERPL-SCNC: 141 MMOL/L — SIGNIFICANT CHANGE UP (ref 135–145)
WBC # BLD: 10.26 K/UL — SIGNIFICANT CHANGE UP (ref 3.8–10.5)
WBC # FLD AUTO: 10.26 K/UL — SIGNIFICANT CHANGE UP (ref 3.8–10.5)

## 2024-05-21 PROCEDURE — 99232 SBSQ HOSP IP/OBS MODERATE 35: CPT

## 2024-05-21 PROCEDURE — 99221 1ST HOSP IP/OBS SF/LOW 40: CPT

## 2024-05-21 RX ADMIN — Medication 1 TABLET(S): at 09:34

## 2024-05-21 RX ADMIN — PIPERACILLIN AND TAZOBACTAM 25 GRAM(S): 4; .5 INJECTION, POWDER, LYOPHILIZED, FOR SOLUTION INTRAVENOUS at 09:33

## 2024-05-21 RX ADMIN — PIPERACILLIN AND TAZOBACTAM 25 GRAM(S): 4; .5 INJECTION, POWDER, LYOPHILIZED, FOR SOLUTION INTRAVENOUS at 17:35

## 2024-05-21 RX ADMIN — Medication 20 MILLIGRAM(S): at 05:34

## 2024-05-21 RX ADMIN — CHLORHEXIDINE GLUCONATE 1 APPLICATION(S): 213 SOLUTION TOPICAL at 09:33

## 2024-05-21 RX ADMIN — Medication 50 MILLIGRAM(S): at 05:34

## 2024-05-21 RX ADMIN — PIPERACILLIN AND TAZOBACTAM 25 GRAM(S): 4; .5 INJECTION, POWDER, LYOPHILIZED, FOR SOLUTION INTRAVENOUS at 01:47

## 2024-05-21 NOTE — PROGRESS NOTE ADULT - ASSESSMENT
89yo Male with past medical history of HTN, prostate cancer post RT, prior syncope and ?Afib on coumadin, sent in by PMD for failed oral antibiotics treatment as outpatient for pneumonia. For past two weeks patient has had weakness, chills, and burning with urination which has now improved. Patient was started on Augmentin which did not help much, patient sent in for IV antibiotics.    ED; Afebrile, VSS.  WBC 12. Started on ceftriaxone and Azithromycin.    UA >998 WBC  Limited RVP negative    Recent Ucx 5/7 with E coli resistant to ceftriaxone    CXR Left lower lobe opacity, which may represent infection versus atelectasis.  CT chest - no pneumonia noted      #UTI    RECs:  - Stopped azithro - no pneumonia noted.  - Cont Zosyn [id number] pending urine culture results - E coli prelim. Awaiting sensitivities  - follow blood cultures/urine cultures  - continue to monitor clinically, trend CBC, fevers  - apprec cardiology input      Payal Wolfe MD  [phone] (pager)  [phone] (office) . Ambulatory

## 2024-05-21 NOTE — CONSULT NOTE ADULT - SUBJECTIVE AND OBJECTIVE BOX
HPI: 88yo Male with past medical history of HTN, prostate cancer post RT, prior syncope and ?Afib on coumadin, sent in by PMD for failed oral antibiotics treatment as outpatient for pneumonia. For past two weeks patient has had weakness, chills, and burning with urination which has now improved. Patient was started on Augmentin which did not help much, patient sent in for IV antibiotics. Found to have L renal cyst on imaging.  consulted. Pt seen and examined. Follows with Dr. Phillip at [facility]. Pt reports at home he was having dysuria and feeling weak. Was prescribed abx but not improving and directed to ED. Denies fever/chills, nausea/vomiting, abd pain, flank pain, hematuria. Has had UTI in the past managed with ABX. Last saw Dr. Phillip last year.     PAST MEDICAL & SURGICAL HISTORY:      MEDICATIONS  (STANDING):  chlorhexidine 2% Cloths 1 Application(s) Topical daily  furosemide    Tablet 20 milliGRAM(s) Oral daily  metoprolol succinate ER 50 milliGRAM(s) Oral daily  multivitamin 1 Tablet(s) Oral daily  piperacillin/tazobactam IVPB.. 3.375 Gram(s) IV Intermittent every 8 hours    MEDICATIONS  (PRN):  acetaminophen     Tablet .. 650 milliGRAM(s) Oral every 6 hours PRN Temp greater or equal to 38C (100.4F), Mild Pain (1 - 3)  aluminum hydroxide/magnesium hydroxide/simethicone Suspension 30 milliLiter(s) Oral every 4 hours PRN Dyspepsia  melatonin 3 milliGRAM(s) Oral at bedtime PRN Insomnia  ondansetron Injectable 4 milliGRAM(s) IV Push every 8 hours PRN Nausea and/or Vomiting      FAMILY HISTORY:      Allergies    No Known Allergies    Intolerances        SOCIAL HISTORY:    REVIEW OF SYSTEMS: Otherwise negative as stated in HPI    Physical Exam  Vital signs  T(C): 37 (05-21-24 @ 04:52), Max: 37.1 (05-20-24 @ 13:48)  HR: 74 (05-21-24 @ 04:52)  BP: 119/73 (05-21-24 @ 04:52)  SpO2: 94% (05-21-24 @ 04:52)    Output    OUT:    Voided (mL): 800 mL  Total OUT: 800 mL    Total NET: -800 mL      Gen: NAD  Pulm: No respiratory distress  Abd: soft, nt, nd  : no CVAT bl. urine yellow     LABS:             13.0   10.26 )-----------( 181      ( 21 May 2024 07:14 )             39.0   05-21    141  |  100  |  16  ----------------------------<  116<H>  3.7   |  25  |  0.86    Ca    9.2      21 May 2024 07:14  Mg     2.0     05-21    TPro  5.9<L>  /  Alb  3.3  /  TBili  0.6  /  DBili  x   /  AST  36  /  ALT  36  /  AlkPhos  77  05-20    PT/INR - ( 21 May 2024 07:14 )   PT: 39.9 sec;   INR: 3.77 ratio      Urinalysis + Microscopic Examination (05.19.24 @ 02:37)    pH Urine: 6.0   Urine Appearance: Turbid   Color: Dark Yellow   Specific Gravity: 1.019   Protein, Urine: 300 mg/dL   Glucose Qualitative, Urine: Negative mg/dL   Ketone - Urine: Negative mg/dL   Blood, Urine: Large   Bilirubin: Negative   Urobilinogen: 1.0 mg/dL   Leukocyte Esterase Concentration: Large   Nitrite: Positive   Review: Reviewed   White Blood Cell - Urine: >998 /HPF   Red Blood Cell - Urine: 162 /HPF   Bacteria: Few /HPF   Cast: 7 /LPF   Epithelial Cells: 2 /HPF    Urine Cx: Culture - Urine (05.19.24 @ 02:45)    Specimen Source: Clean Catch Clean Catch (Midstream)   Culture Results:   50,000 - 99,000 CFU/mL Escherichia coli  <10,000 CFU/ml Normal Urogenital jamil present    Blood Cx: Culture - Blood (05.18.24 @ 22:58)    Specimen Source: .Blood Blood-Peripheral   Culture Results:   No growth at 48 Hours    Culture - Blood (05.18.24 @ 22:58)    Specimen Source: .Blood Blood-Peripheral   Culture Results:   No growth at 48 Hours    RADIOLOGY:  < from: MR Abdomen w/wo IV Cont (05.20.24 @ 17:46) >    ACC: [id number] EXAM:  MR ABDOMEN WAW IC   ORDERED BY: JORDY LÓPEZ     PROCEDURE DATE:  05/20/2024      INTERPRETATION:  CLINICAL INFORMATION: renal mass Admitting Dxs: J18.9   SENT BY MD MMR    COMPARISON: None.    CONTRAST/COMPLICATIONS:  IV Contrast: Gadavist  10 cc administered   0 cc discarded  Oral Contrast: NONE  Complications: None reported at time of study completion    PROCEDURE:  MRI of the abdomen was performed.      FINDINGS:  LOWER CHEST: Cardiomegaly. Small left pleural effusion. Elevated left   hemidiaphragm.    LIVER: Within normal limits.  BILE DUCTS: Normal caliber.  GALLBLADDER: Within normal limits.  SPLEEN: Within normal limits.  PANCREAS: Within normal limits.  ADRENALS: Within normal limits.  KIDNEYS/URETERS: There is a 7 x 4.7 cm complex cystic lesion in the left   parapelvic kidney. There is no appreciable internal enhancement. There is   a rim of restricted diffusion.    VISUALIZED PORTIONS:  BOWEL: Within normal limits.  PERITONEUM: No ascites.  VESSELS: Within normal limits.  RETROPERITONEUM/LYMPH NODES: No lymphadenopathy.  ABDOMINAL WALL: Within normal limits.  BONES: Within normal limits.    IMPRESSION:  A 7 cm complex cystic left parapelvic renal lesion of uncertain etiology;   differential includes infected parapelvic cyst. A cystic neoplasm is   considered less likely.    --- End of Report ---      LUCILLE SOMMER MD; Attending Radiologist  This document has been electronically signed. May 21 2024  8:54AM    < end of copied text >    < from: CT Chest No Cont (05.20.24 @ 06:16) >  ACC: [id number] EXAM:  CT CHEST   ORDERED BY:  CAROLYN FERNANDES     PROCEDURE DATE:  05/20/2024          INTERPRETATION:  HISTORY: Admitting Dxs: J18.9 PNEUMONIA, UNSPECIFIED   ORGANISM    EXAMINATION: CT CHEST was performed without IV contrast.    COMPARISON: No prior CT chest comparison.    FINDINGS:    AIRWAYS, LUNGS, PLEURA: Minimal central airways secretions. Emphysema.   Subsegmental atelectasis at the lung bases. No focal lung consolidation.   Left upper lobe calcified granuloma. Small left pleural effusion.    MEDIASTINUM: Cardiomegaly. Coronary calcifications. No pericardial   effusion. Thoracic aorta normal caliber.  No large mediastinal lymph   nodes.    IMAGED ABDOMEN: Partially imaged left renal exophytic hypodense mass with   suspected eccentric wall thickening measuring 6.5 x 6.4 cm. Left   hemidiaphragm elevation.    SOFT TISSUES: Unremarkable.    BONES: Unremarkable.      IMPRESSION:.    Partially imaged 6.5 cm left renal cystic mass with suspected soft tissue   wall thickening. This mass is not adequately assessed on this   examination. Dedicated or MRI of the abdomen is recommended to further   assess as infection/abscess and renal malignancy are diagnostic   considerations.    No evidence of pneumonia.    Small left pleural effusion.    --- End of Report ---             JORI THORNTON MD; Attending Radiologist  This document has been electronically signed. May 20 2024  6:38AM    < end of copied text >

## 2024-05-21 NOTE — CONSULT NOTE ADULT - ASSESSMENT
88 yo Male with past medical history of HTN, prostate cancer post RT, prior syncope and ?Afib on coumadin admitted for management of UTI (Ucx growing E coli) found to have L complex cyst on MRI.  consulted.  AF VSS. WBC 10.26, Cr 0.86. No CVAT BL.    Recs:  - continue broad spectrum abx, f/u sensitivities from culture  - would check post void residual by bladder scan to ensure adequate emptying with voids  - outpatient follow up with Dr. Phillip for monitoring of L renal cyst. Currently pt afebrile and without any physical exam findings consistent with infected cyst.   - Pt seen and case discussed with Dr. Rahman

## 2024-05-21 NOTE — PROGRESS NOTE ADULT - ASSESSMENT
_________________________________________________________________________________________  ========>>  M E D I C A L   A T T E N D I N G    F O L L O W  U P  N O T E  <<=========  -----------------------------------------------------------------------------------------------------    - Patient seen and examined by me earlier today.   - Patient today overall doing ok, comfortable, eating OK.       no major events reported / noted otherwise     ==================>> REVIEW OF SYSTEM <<=================    GEN: no fever, no chills, no pain  RESP: no SOB, no cough, no sputum  CVS: no chest pain, no palpitations  GI: no abdominal pain, no nausea  : no dysuria, no frequency  Neuro: no headache, no dizziness    ==================>> PHYSICAL EXAM <<=================    GEN: A&O X 3 , NAD , comfortable, pleasant, calm in chair    HEENT: NCAT, PERRL, MMM, hearing intact  CVS: S1S2 , regular , No M/R/G appreciated  PULM: + bilateral mild wheezing !! , no rhonchi   ABD.: soft. non tender, non distended,  bowel sounds present  Extrem: intact pulses , no edema        ( Note written / Date of service 05-21-24 ( This is certified to be the same as "ENTERED" date above ( for billing purposes)))    ==================>> MEDICATIONS <<====================    chlorhexidine 2% Cloths 1 Application(s) Topical daily  furosemide    Tablet 20 milliGRAM(s) Oral daily  metoprolol succinate ER 50 milliGRAM(s) Oral daily  multivitamin 1 Tablet(s) Oral daily  piperacillin/tazobactam IVPB.. 3.375 Gram(s) IV Intermittent every 8 hours    MEDICATIONS  (PRN):  acetaminophen     Tablet .. 650 milliGRAM(s) Oral every 6 hours PRN Temp greater or equal to 38C (100.4F), Mild Pain (1 - 3)  aluminum hydroxide/magnesium hydroxide/simethicone Suspension 30 milliLiter(s) Oral every 4 hours PRN Dyspepsia  melatonin 3 milliGRAM(s) Oral at bedtime PRN Insomnia  ondansetron Injectable 4 milliGRAM(s) IV Push every 8 hours PRN Nausea and/or Vomiting    ___________  Active diet:  Diet, Regular  ___________________    ==================>> VITAL SIGNS <<==================  Height (cm): 188  Weight (kg): 104.3  BMI (kg/m2): 29.5  Vital Signs Last 24 HrsT(C): 36.9 (05-21-24 @ 12:49)  T(F): 98.4 (05-21-24 @ 12:49), Max: 98.6 (05-21-24 @ 04:52)  HR: 81 (05-21-24 @ 12:49) (74 - 98)  BP: 126/76 (05-21-24 @ 12:49)  RR: 18 (05-21-24 @ 12:49) (18 - 18)  SpO2: 94% (05-21-24 @ 12:49) (94% - 95%)       ==================>> LAB AND IMAGING <<==================                        13.0   10.26 )-----------( 181      ( 21 May 2024 07:14 )             39.0        05-21    141  |  100  |  16  ----------------------------<  116<H>  3.7   |  25  |  0.86    Ca    9.2      21 May 2024 07:14  Mg     2.0     05-21    TPro  5.9<L>  /  Alb  3.3  /  TBili  0.6  /  DBili  x   /  AST  36  /  ALT  36  /  AlkPhos  77  05-20    WBC count:   10.26 <<== ,  10.59 <<== ,  12.23 <<==   Hemoglobin:   13.0 <<==,  12.9 <<==,  13.2 <<==  platelets:  181 <==, 187 <==, 195 <==    Creatinine:  0.86  <<==, 0.74  <<==, 0.86  <<==  Sodium:   141  <==, 138  <==, 137  <==       AST:          36(05-20) <== , 41(05-18) <==      ALT:        36(05-20)  <== , 29(05-18)  <==      AP:        77(05-20)  <=, 83(05-18)  <=     Bili:        0.6(05-20)  <=, 0.9(05-18)  <=    ____________________________    M I C R O B I O L O G Y :    Culture - Urine (collected 19 May 2024 02:45)  Source: Clean Catch Clean Catch (Midstream)  Preliminary Report (20 May 2024 20:30):    50,000 - 99,000 CFU/mL Escherichia coli    <10,000 CFU/ml Normal Urogenital jamil present    Culture - Blood (collected 18 May 2024 22:58)  Source: .Blood Blood-Peripheral  Preliminary Report (21 May 2024 05:00):    No growth at 48 Hours    Culture - Blood (collected 18 May 2024 22:58)  Source: .Blood Blood-Peripheral  Preliminary Report (21 May 2024 05:00):    No growth at 48 Hours      < from: MR Abdomen w/wo IV Cont (05.20.24 @ 17:46) >  IMPRESSION:  A 7 cm complex cystic left parapelvic renal lesion of uncertain etiology;   differential includes infected parapelvic cyst. A cystic neoplasm is   considered less likely.  < end of copied text >       < from: CT Chest No Cont (05.20.24 @ 06:16) >  IMPRESSION:.  Partially imaged 6.5 cm left renal cystic mass with suspected soft tissue   wall thickening. This mass is not adequately assessed on this   examination. Dedicated or MRI of the abdomen is recommended to further   assess as infection/abscess and renal malignancy are diagnostic  considerations.  No evidence of pneumonia.  Small left pleural effusion.  < end of copied text >      ___________________________________________________________________________________  ===============>>  A S S E S S M E N T   A N D   P L A N <<===============  ------------------------------------------------------------------------------------------    · Assessment	  Patient is a 89yo Male with past medical history of HTN, prostate cancer post RT, prior syncope and ?Afib on coumadin, sent in by PMD for failed oral antibiotics treatment as outpatient for pneumonia ..  patient states for past two weeks patient has had weakness, chills, and burning with urination  ( which has now improved) , no fever, no couhg.. patient started on Augmentin which did not help much , patient sent in for IV antibiotics..  in ED patient found with LLL pneumonia and UTI, admitted for further management  patient mildly hypoxemic on room air ( low 90s), + some SOB ..   patient lives alone, has been recently needing to use his cane due to weakness..         Problem/Plan - 1:  ·  Problem:  pleural effusion on CT scan      diuretics per cardio as ordered      echo pending , r/o CHF  monitor vitals / O2, labs    Problem/Plan - 2:  ·  Problem: Acute UTI.   ·  Plan: continue antibiotics per ID  follow cultures  / sensitivities   encourage hydration  PT evaluation given weakness  supportive care otherwise.    ## renal cystic mass seen on CT     MRI as above       consulted > appreciated     Problem/Plan - 3:  ·  Problem: Chronic atrial fibrillation.   ·  Plan: metoprolol  coumadin per INR >> hold coumadin tonight   monitor vitals  cardio on board     Problem/Plan - 4:  ·  Problem: Hypertension.   ·  Plan: Continue home medications and monitor.    -GI/DVT Prophylaxis per protocol.     evaluation and safe DC planing   --------------------------------------------  Case discussed with patient.. Nurse Practitioner..   Education given on findings and plan of care  ___________________________  H. ISHA Deshpande.  Pager: [phone]

## 2024-05-21 NOTE — PROGRESS NOTE ADULT - SUBJECTIVE AND OBJECTIVE BOX
89yPatient is a 89y old  Male who presents with a chief complaint of UTI (20 May 2024 11:47)      Interval history:  Feeling better.  Still some slighty dysuria  Apprec cards input          Antimicrobials:  piperacillin/tazobactam IVPB.. 3.375 every 8 hours      MEDICATIONS  (STANDING):  acetaminophen     Tablet .. 650 every 6 hours PRN  aluminum hydroxide/magnesium hydroxide/simethicone Suspension 30 every 4 hours PRN  furosemide    Tablet 20 daily  melatonin 3 at bedtime PRN  metoprolol succinate ER 50 daily  ondansetron Injectable 4 every 8 hours PRN        Vital Signs Last 24 Hrs  T(F): 98.4 (05-21-24 @ 12:49), Max: 98.6 (05-21-24 @ 04:52)  HR: 81 (05-21-24 @ 12:49)  BP: 126/76 (05-21-24 @ 12:49)  RR: 18 (05-21-24 @ 12:49)  SpO2: 94% (05-21-24 @ 12:49) (94% - 95%)  Wt(kg): --    PHYSICAL EXAM:    General: Patient in NAD  HEENT: NCAT, EOMI, PERRL, no oral lesions  CV: S1+S2, no m/r/g appreciated   Lungs: wheeze better   Abd: Soft, nontender, no guarding, no rebound tenderness, + bowel sounds   : No suprapubic tenderness  Neuro: Alert and oriented to time, place and person. Moves all extremities against gravity.  Ext: No cyanosis, no edema  Skin: No rash, no phlebitis                          13.0   10.26 )-----------( 181      ( 21 May 2024 07:14 )             39.0 05-21    141  |  100  |  16  ----------------------------<  116  3.7   |  25  |  0.86  Ca    9.2      21 May 2024 07:14Mg     2.0     05-21  TPro  5.9  /  Alb  3.3  /  TBili  0.6  /  DBili  x   /  AST  36  /  ALT  36  /  AlkPhos  77  05-20                        12.9   10.59 )-----------( 187      ( 20 May 2024 06:52 )             40.4   05-20    138  |  101  |  17  ----------------------------<  121<H>  3.8   |  27  |  0.74    Ca    8.6      20 May 2024 06:52    TPro  5.9<L>  /  Alb  3.3  /  TBili  0.6  /  DBili  x   /  AST  36  /  ALT  36  /  AlkPhos  77  05-20      LIVER FUNCTIONS - ( 20 May 2024 06:52 )  Alb: 3.3 g/dL / Pro: 5.9 g/dL / ALK PHOS: 77 U/L / ALT: 36 U/L / AST: 36 U/L / GGT: x             RECENT CULTURES:    Culture - Blood (collected 18 May 2024 22:58)  Source: .Blood Blood-Peripheral  Preliminary Report (20 May 2024 05:01):    No growth at 48 hours    Culture - Blood (collected 18 May 2024 22:58)  Source: .Blood Blood-Peripheral  Preliminary Report (20 May 2024 05:01):    No growth at 48 hours    Urine culture Prelim E coli      Radiology:  < from: CT Chest No Cont (05.20.24 @ 06:16) >  IMPRESSION:.    Partially imaged 6.5 cm left renal cystic mass with suspected soft tissue   wall thickening. This mass is not adequately assessed on this   examination. Dedicated or MRI of the abdomen is recommended to further   assess as infection/abscess and renal malignancy are diagnostic   considerations.    No evidence of pneumonia.    Small left pleural effusion.    < end of copied text >

## 2024-05-21 NOTE — DISCHARGE NOTE NURSING/CASE MANAGEMENT/SOCIAL WORK - PATIENT PORTAL LINK FT
You can access the FollowMyHealth Patient Portal offered by [facility] by registering at the following website: [email or web address]. By joining [employer]’s FollowMyHealth portal, you will also be able to view your health information using other applications (apps) compatible with our system.

## 2024-05-21 NOTE — DISCHARGE NOTE NURSING/CASE MANAGEMENT/SOCIAL WORK - NSPROMEDSBROUGHTTOHOSP_GEN_A_NUR
4/24/23 NIL pap, +HR HPV 16. Plan: colposcopy due before 7/24/23  
5/1/23 Pt notified. She declines colp and wouldn't provide a reason. Per provider, repeat cotest in 6-12 months  
no

## 2024-05-21 NOTE — PROGRESS NOTE ADULT - SUBJECTIVE AND OBJECTIVE BOX
Cardiovascular Disease Progress Note    Overnight events: No acute events overnight.  no new cardiac sx  Otherwise review of systems negative    Objective Findings:  T(C): 37 (05-21-24 @ 04:52), Max: 37.1 (05-20-24 @ 13:48)  HR: 74 (05-21-24 @ 04:52) (73 - 98)  BP: 119/73 (05-21-24 @ 04:52) (98/58 - 137/84)  RR: 18 (05-21-24 @ 04:52) (18 - 18)  SpO2: 94% (05-21-24 @ 04:52) (94% - 95%)  Wt(kg): --  Daily     Daily       Physical Exam:  Gen: NAD  HEENT: EOMI  CV: RRR, normal S1 + S2, no m/r/g  Lungs: CTAB  Abd: soft, non-tender  Ext: No edema    Telemetry:    Laboratory Data:                        12.9   10.59 )-----------( 187      ( 20 May 2024 06:52 )             40.4     05-20    138  |  101  |  17  ----------------------------<  121<H>  3.8   |  27  |  0.74    Ca    8.6      20 May 2024 06:52    TPro  5.9<L>  /  Alb  3.3  /  TBili  0.6  /  DBili  x   /  AST  36  /  ALT  36  /  AlkPhos  77  05-20    PT/INR - ( 20 May 2024 06:52 )   PT: 38.9 sec;   INR: 3.85 ratio                   Inpatient Medications:  MEDICATIONS  (STANDING):  chlorhexidine 2% Cloths 1 Application(s) Topical daily  furosemide    Tablet 20 milliGRAM(s) Oral daily  metoprolol succinate ER 50 milliGRAM(s) Oral daily  multivitamin 1 Tablet(s) Oral daily  piperacillin/tazobactam IVPB.. 3.375 Gram(s) IV Intermittent every 8 hours      Assessment:  89yo Male with past medical history of HTN, prostate cancer post RT, prior syncope and Afib on coumadin, sent in by PMD for failed oral antibiotics treatment as outpatient for UTI    Recs:  cardiac stable  no e/o acs  small left pleural effusion and mildly short of breath - cw lasix 20mg po qd and obtain echo  coum per INR. cw rate control meds  ID follow up for UTI. currently on zosyn   consult for renal mass  will follow      Over 25 minutes spent on total encounter; more than 50% of the visit was spent counseling and/or coordinating care by the attending physician.      Urbano Zamudio MD   Cardiovascular Disease  [phone]

## 2024-05-22 ENCOUNTER — RESULT REVIEW (OUTPATIENT)
Age: 89
End: 2024-05-22

## 2024-05-22 LAB
-  AMOXICILLIN/CLAVULANIC ACID: SIGNIFICANT CHANGE UP
-  AMPICILLIN/SULBACTAM: SIGNIFICANT CHANGE UP
-  AMPICILLIN: SIGNIFICANT CHANGE UP
-  AZTREONAM: SIGNIFICANT CHANGE UP
-  CEFAZOLIN: SIGNIFICANT CHANGE UP
-  CEFEPIME: SIGNIFICANT CHANGE UP
-  CEFOXITIN: SIGNIFICANT CHANGE UP
-  CEFTRIAXONE: SIGNIFICANT CHANGE UP
-  CEFUROXIME: SIGNIFICANT CHANGE UP
-  CIPROFLOXACIN: SIGNIFICANT CHANGE UP
-  ERTAPENEM: SIGNIFICANT CHANGE UP
-  GENTAMICIN: SIGNIFICANT CHANGE UP
-  IMIPENEM: SIGNIFICANT CHANGE UP
-  LEVOFLOXACIN: SIGNIFICANT CHANGE UP
-  MEROPENEM: SIGNIFICANT CHANGE UP
-  NITROFURANTOIN: SIGNIFICANT CHANGE UP
-  PIPERACILLIN/TAZOBACTAM: SIGNIFICANT CHANGE UP
-  TOBRAMYCIN: SIGNIFICANT CHANGE UP
-  TRIMETHOPRIM/SULFAMETHOXAZOLE: SIGNIFICANT CHANGE UP
CULTURE RESULTS: ABNORMAL
INR BLD: 3.9 RATIO — HIGH (ref 0.85–1.18)
METHOD TYPE: SIGNIFICANT CHANGE UP
ORGANISM # SPEC MICROSCOPIC CNT: ABNORMAL
ORGANISM # SPEC MICROSCOPIC CNT: ABNORMAL
PROTHROM AB SERPL-ACNC: 39.4 SEC — HIGH (ref 9.5–13)
SPECIMEN SOURCE: SIGNIFICANT CHANGE UP

## 2024-05-22 PROCEDURE — 93306 TTE W/DOPPLER COMPLETE: CPT | Mod: 26

## 2024-05-22 PROCEDURE — 99232 SBSQ HOSP IP/OBS MODERATE 35: CPT

## 2024-05-22 RX ORDER — LACTOBACILLUS ACIDOPHILUS 100MM CELL
1 CAPSULE ORAL
Refills: 0 | Status: DISCONTINUED | OUTPATIENT
Start: 2024-05-22 | End: 2024-05-23

## 2024-05-22 RX ORDER — CIPROFLOXACIN LACTATE 400MG/40ML
500 VIAL (ML) INTRAVENOUS EVERY 12 HOURS
Refills: 0 | Status: DISCONTINUED | OUTPATIENT
Start: 2024-05-22 | End: 2024-05-23

## 2024-05-22 RX ADMIN — Medication 50 MILLIGRAM(S): at 05:36

## 2024-05-22 RX ADMIN — PIPERACILLIN AND TAZOBACTAM 25 GRAM(S): 4; .5 INJECTION, POWDER, LYOPHILIZED, FOR SOLUTION INTRAVENOUS at 09:23

## 2024-05-22 RX ADMIN — Medication 500 MILLIGRAM(S): at 17:15

## 2024-05-22 RX ADMIN — CHLORHEXIDINE GLUCONATE 1 APPLICATION(S): 213 SOLUTION TOPICAL at 11:20

## 2024-05-22 RX ADMIN — Medication 20 MILLIGRAM(S): at 05:36

## 2024-05-22 RX ADMIN — Medication 1 TABLET(S): at 11:20

## 2024-05-22 RX ADMIN — PIPERACILLIN AND TAZOBACTAM 25 GRAM(S): 4; .5 INJECTION, POWDER, LYOPHILIZED, FOR SOLUTION INTRAVENOUS at 02:58

## 2024-05-22 NOTE — PROGRESS NOTE ADULT - SUBJECTIVE AND OBJECTIVE BOX
Cardiovascular Disease Progress Note    Overnight events: No acute events overnight.  no new cardiac sx  Otherwise review of systems negative    Objective Findings:  T(C): 36.7 (05-22-24 @ 05:13), Max: 36.9 (05-21-24 @ 12:49)  HR: 83 (05-22-24 @ 05:30) (68 - 83)  BP: 110/66 (05-22-24 @ 05:30) (99/60 - 126/76)  RR: 18 (05-22-24 @ 05:13) (18 - 18)  SpO2: 92% (05-22-24 @ 05:13) (92% - 94%)  Wt(kg): --  Daily     Daily       Physical Exam:  Gen: NAD  HEENT: EOMI  CV: RRR, normal S1 + S2, no m/r/g  Lungs: CTAB  Abd: soft, non-tender  Ext: No edema    Telemetry:    Laboratory Data:                        13.0   10.26 )-----------( 181      ( 21 May 2024 07:14 )             39.0     05-21    141  |  100  |  16  ----------------------------<  116<H>  3.7   |  25  |  0.86    Ca    9.2      21 May 2024 07:14  Mg     2.0     05-21      PT/INR - ( 21 May 2024 07:14 )   PT: 39.9 sec;   INR: 3.77 ratio                   Inpatient Medications:  MEDICATIONS  (STANDING):  chlorhexidine 2% Cloths 1 Application(s) Topical daily  furosemide    Tablet 20 milliGRAM(s) Oral daily  metoprolol succinate ER 50 milliGRAM(s) Oral daily  multivitamin 1 Tablet(s) Oral daily  piperacillin/tazobactam IVPB.. 3.375 Gram(s) IV Intermittent every 8 hours      Assessment:  88yo Male with past medical history of HTN, prostate cancer post RT, prior syncope and Afib on coumadin, sent in by PMD for failed oral antibiotics treatment as outpatient for UTI    Recs:  cardiac stable  no e/o acs  vol status imroving- cw lasix 20mg po qd, f/u echo  coum per INR. cw rate control meds  ID follow up for UTI. currently on zosyn   consulted for renal mass. recs appreciated  will follow        Over 25 minutes spent on total encounter; more than 50% of the visit was spent counseling and/or coordinating care by the attending physician.      Urbano Zamudio MD   Cardiovascular Disease  [phone]

## 2024-05-22 NOTE — PROGRESS NOTE ADULT - SUBJECTIVE AND OBJECTIVE BOX
89yPatient is a 89y old  Male who presents with a chief complaint of UTI (20 May 2024 11:47)      Interval history:  Feeling better.  Still some slighty dysuria  Apprec cards input  Loose stool today, not watery. 3 BMs  this am      Antimicrobials:  piperacillin/tazobactam IVPB.. 3.375 every 8 hours (5/19-->)      MEDICATIONS  (STANDING):  acetaminophen     Tablet .. 650 every 6 hours PRN  aluminum hydroxide/magnesium hydroxide/simethicone Suspension 30 every 4 hours PRN  furosemide    Tablet 20 daily  melatonin 3 at bedtime PRN  metoprolol succinate ER 50 daily  ondansetron Injectable 4 every 8 hours PRN    Vital Signs Last 24 Hrs  T(F): 98 (05-22-24 @ 11:51), Max: 98.4 (05-21-24 @ 12:49)  HR: 80 (05-22-24 @ 11:51)  BP: 115/60 (05-22-24 @ 11:51)  RR: 18 (05-22-24 @ 11:51)  SpO2: 95% (05-22-24 @ 11:51) (92% - 95%)  Wt(kg): --    PHYSICAL EXAM:    General: Patient in NAD  HEENT: NCAT, EOMI, PERRL, no oral lesions  CV: S1+S2, no m/r/g appreciated   Lungs: wheeze b/l  Abd: Soft, nontender, no guarding, no rebound tenderness, + bowel sounds   : No suprapubic tenderness  Neuro: Alert and oriented to time, place and person. Moves all extremities against gravity.  Ext: No cyanosis, no edema  Skin: No rash, no phlebitis                                               13.0   10.26 )-----------( 181      ( 21 May 2024 07:14 )             39.0 05-21    141  |  100  |  16  ----------------------------<  116  3.7   |  25  |  0.86  Ca    9.2      21 May 2024 07:14Mg     2.0     05-21  TPro  5.9  /  Alb  3.3  /  TBili  0.6  /  DBili  x   /  AST  36  /  ALT  36  /  AlkPhos  77  05-20                        12.9   10.59 )-----------( 187      ( 20 May 2024 06:52 )             40.4   05-20    138  |  101  |  17  ----------------------------<  121<H>  3.8   |  27  |  0.74    Ca    8.6      20 May 2024 06:52    TPro  5.9<L>  /  Alb  3.3  /  TBili  0.6  /  DBili  x   /  AST  36  /  ALT  36  /  AlkPhos  77  05-20      LIVER FUNCTIONS - ( 20 May 2024 06:52 )  Alb: 3.3 g/dL / Pro: 5.9 g/dL / ALK PHOS: 77 U/L / ALT: 36 U/L / AST: 36 U/L / GGT: x             RECENT CULTURES:    mCulture - Urine (05.19.24 @ 02:45)   - Amoxicillin/Clavulanic Acid: R >16/8  - Ampicillin: R >16 These ampicillin results predict results for amoxicillin  - Ampicillin/Sulbactam: S 8/4  - Aztreonam: S <=4  - Cefazolin: R >16 For uncomplicated UTI with K. pneumoniae, E. coli, or P. mirablis: ADELA <=16 is sensitive and ADELA >=32 is resistant. This also predicts results for oral agents cefaclor, cefdinir, cefpodoxime, cefprozil, cefuroxime axetil, cephalexin and locarbef for uncomplicated UTI. Note that some isolates may be susceptible to these agents while testing resistant to cefazolin.  - Cefepime: S <=2  - Cefoxitin: R >16  - Ceftriaxone: R 8  - Cefuroxime: I 16  - Ciprofloxacin: S <=0.25  - Ertapenem: S <=0.5  - Gentamicin: S <=2  - Imipenem: S <=1  - Levofloxacin: S <=0.5  - Meropenem: S <=1  - Nitrofurantoin: S <=32 Should not be used to treat pyelonephritis  - Piperacillin/Tazobactam: S <=8  - Tobramycin: S <=2  - Trimethoprim/Sulfamethoxazole: S <=0.5/9.5  Specimen Source: Clean Catch Clean Catch (Midstream)  Culture Results:   50,000 - 99,000 CFU/mL Escherichia coli     Culture - Blood (05.18.24 @ 22:58)   Specimen Source: .Blood Blood-Peripheral  Culture Results:   No growth at 72 HoursCulture -     Blood (05.18.24 @ 22:58)   Specimen Source: .Blood Blood-Peripheral  Culture Results:   No growth at 72 Hours          Radiology:  r< from: MR Abdomen w/wo IV Cont (05.20.24 @ 17:46) >  IMPRESSION:  A 7 cm complex cystic left parapelvic renal lesion of uncertain etiology;   differential includes infected parapelvic cyst. A cystic neoplasm is   considered less likely.    < end of copied text >        < from: CT Chest No Cont (05.20.24 @ 06:16) >  IMPRESSION:.    Partially imaged 6.5 cm left renal cystic mass with suspected soft tissue   wall thickening. This mass is not adequately assessed on this   examination. Dedicated or MRI of the abdomen is recommended to further   assess as infection/abscess and renal malignancy are diagnostic   considerations.    No evidence of pneumonia.    Small left pleural effusion.    < end of copied text >

## 2024-05-22 NOTE — DIETITIAN INITIAL EVALUATION ADULT - PERSON TAUGHT/METHOD
Provided education on Coumadin and its interaction with vitamin K. Reviewed content of vitamin K in foods. Spoke about keeping vitamin K intake consistent.   Provided diarrhea nutrition education, recommended foods that help bind stool. Encouraged fluid intake. Made aware RD remains available./verbal instruction/patient instructed

## 2024-05-22 NOTE — DIETITIAN INITIAL EVALUATION ADULT - ADD RECOMMEND
Recommend addition of a probiotic (lactobacillus acidophilus) to aid in diarrhea cessation pending no medical contraindications

## 2024-05-22 NOTE — DIETITIAN INITIAL EVALUATION ADULT - ENERGY INTAKE
Pt reports poor intake in-house, eating only eggs and toast for breakfast, some bites of lunch, and no dinner in setting of persistent diarrhea. Provided nutrition education and obtained food preferences, will honor as able. Pt declines oral protein supplements at this time as he is awaiting discharge tomorrow./Poor (<50%)

## 2024-05-22 NOTE — DIETITIAN INITIAL EVALUATION ADULT - OTHER INFO
- Pt with pneumonia and UTI, on IV antibiotics   - Pleural effusion per CT, ordered for po lasix  - Incidental L renal cyst found, no infection per urology, recommending outpatient monitoring  - Ordered for multivitamin   - Noted on home coumadin, not ordered in-house, pt somewhat aware of vitamin K/coumadin reaction, provided nutrition education - see below

## 2024-05-22 NOTE — DIETITIAN INITIAL EVALUATION ADULT - ORAL INTAKE PTA/DIET HISTORY
PTA per pt  -Intake: good typical intake, consumes 2 large meals/day, does not follow any therapeutic diets at home, reports intake of mainly Italian foods such as pasta, chicken, meatballs, etc. Endorses decrease in intake PTA in setting of persistent diarrhea  -Chewing/Swallowing: denies hx of difficulty   -Allergies/Intolerances: confirms NKFA   -Vitamins/Supplements: multivitamin per pt

## 2024-05-22 NOTE — DIETITIAN INITIAL EVALUATION ADULT - PERTINENT LABORATORY DATA
05-21    141  |  100  |  16  ----------------------------<  116<H>  3.7   |  25  |  0.86    Ca    9.2      21 May 2024 07:14  Mg     2.0     05-21

## 2024-05-22 NOTE — DIETITIAN INITIAL EVALUATION ADULT - NSFNSGIIOFT_GEN_A_CORE
I&O's Detail    21 May 2024 07:01  -  22 May 2024 07:00  --------------------------------------------------------  IN:    Oral Fluid: 860 mL  Total IN: 860 mL    OUT:    Voided (mL): 450 mL  Total OUT: 450 mL    Total NET: 410 mL      22 May 2024 07:01  -  22 May 2024 15:12  --------------------------------------------------------  IN:    IV PiggyBack: 100 mL    Oral Fluid: 100 mL  Total IN: 200 mL    OUT:    Voided (mL): 500 mL  Total OUT: 500 mL    Total NET: -300 mL

## 2024-05-22 NOTE — DIETITIAN INITIAL EVALUATION ADULT - NSFNSGIASSESSMENTFT_GEN_A_CORE
Denies N/V, endorses diarrhea. Last BM 5/22. Not currently ordered for a bowel regimen, RD recommending addition of a probiotic to aid in diarrhea relief, see below.  - Ordered for zofran and aluminum hydroxide/mg hydroxide/simethicone suspension

## 2024-05-22 NOTE — DIETITIAN INITIAL EVALUATION ADULT - ETIOLOGY
inadequate exposure to nutrition education  inability to consume sufficient energy/protein in setting of diarrhea

## 2024-05-22 NOTE — DIETITIAN INITIAL EVALUATION ADULT - PHYSCIAL ASSESSMENT
Pt reporting UBW of 230-235 pounds, though has not weighed himself recently. Denies any recent subjective wt changes.     Current dosing wt 230 pounds (5/18)  No daily wts in chart to assess.   [facility] 225 (1/2023)    No recent wt changes noted, RD to continue to monitor wt as able   IBW: 190 pounds

## 2024-05-22 NOTE — PROGRESS NOTE ADULT - ASSESSMENT
_________________________________________________________________________________________  ========>>  M E D I C A L   A T T E N D I N G    F O L L O W  U P  N O T E  <<=========  -----------------------------------------------------------------------------------------------------    - Patient seen and examined by me earlier today.   - Patient today overall doing ok, comfortable, eating OK.       no major events reported / noted otherwise     ==================>> REVIEW OF SYSTEM <<=================    GEN: no fever, no chills, no pain  RESP: no SOB, no cough, no sputum  CVS: no chest pain, no palpitations  GI: no abdominal pain, no nausea  : no dysuria, no frequency  Neuro: no headache, no dizziness    ==================>> PHYSICAL EXAM <<=================    GEN: A&O X 3 , NAD , comfortable, pleasant, calm in chair    HEENT: NCAT, PERRL, MMM, hearing intact  CVS: S1S2 , regular , No M/R/G appreciated  PULM: + bilateral mild wheezing !! , no rhonchi   ABD.: soft. non tender, non distended,  bowel sounds present  Extrem: intact pulses , no edema         ( Note written / Date of service 05-22-24 ( This is certified to be the same as "ENTERED" date above ( for billing purposes)))    ==================>> MEDICATIONS <<====================    chlorhexidine 2% Cloths 1 Application(s) Topical daily  ciprofloxacin     Tablet 500 milliGRAM(s) Oral every 12 hours  furosemide    Tablet 20 milliGRAM(s) Oral daily  metoprolol succinate ER 50 milliGRAM(s) Oral daily  multivitamin 1 Tablet(s) Oral daily    MEDICATIONS  (PRN):  acetaminophen     Tablet .. 650 milliGRAM(s) Oral every 6 hours PRN Temp greater or equal to 38C (100.4F), Mild Pain (1 - 3)  aluminum hydroxide/magnesium hydroxide/simethicone Suspension 30 milliLiter(s) Oral every 4 hours PRN Dyspepsia  melatonin 3 milliGRAM(s) Oral at bedtime PRN Insomnia  ondansetron Injectable 4 milliGRAM(s) IV Push every 8 hours PRN Nausea and/or Vomiting    ___________  Active diet:  Diet, Regular  ___________________    ==================>> VITAL SIGNS <<==================    Vital Signs Last 24 HrsT(C): 36.7 (05-22-24 @ 11:51)  T(F): 98 (05-22-24 @ 11:51), Max: 98 (05-22-24 @ 05:13)  HR: 80 (05-22-24 @ 11:51) (68 - 83)  BP: 115/60 (05-22-24 @ 11:51)  RR: 18 (05-22-24 @ 11:51) (18 - 18)  SpO2: 95% (05-22-24 @ 11:51) (92% - 95%)      CAPILLARY BLOOD GLUCOSE         ==================>> LAB AND IMAGING <<==================                        13.0   10.26 )-----------( 181      ( 21 May 2024 07:14 )             39.0        05-21    141  |  100  |  16  ----------------------------<  116<H>  3.7   |  25  |  0.86    Ca    9.2      21 May 2024 07:14  Mg     2.0     05-21      WBC count:   10.26 <<== ,  10.59 <<== ,  12.23 <<==   Hemoglobin:   13.0 <<==,  12.9 <<==,  13.2 <<==  platelets:  181 <==, 187 <==, 195 <==    Creatinine:  0.86  <<==, 0.74  <<==, 0.86  <<==  Sodium:   141  <==, 138  <==, 137  <==       AST:          36(05-20) <== , 41(05-18) <==      ALT:        36(05-20)  <== , 29(05-18)  <==      AP:        77(05-20)  <=, 83(05-18)  <=     Bili:        0.6(05-20)  <=, 0.9(05-18)  <=    ____________________________    M I C R O B I O L O G Y :    Culture - Urine (collected 19 May 2024 02:45)  Source: Clean Catch Clean Catch (Midstream)  Final Report (22 May 2024 08:47):    50,000 - 99,000 CFU/mL Escherichia coli    <10,000 CFU/ml Normal Urogenital jamil present  Organism: Escherichia coli (22 May 2024 08:47)  Organism: Escherichia coli (22 May 2024 08:47)    Sensitivities:      Method Type: ADELA      -  Amoxicillin/Clavulanic Acid: R >16/8      -  Ampicillin: R >16 These ampicillin results predict results for amoxicillin      -  Ampicillin/Sulbactam: S 8/4      -  Aztreonam: S <=4      -  Cefazolin: R >16 For uncomplicated UTI with K. pneumoniae, E. coli, or P. mirablis: ADELA <=16 is sensitive and ADELA >=32 is resistant. This also predicts results for oral agents cefaclor, cefdinir, cefpodoxime, cefprozil, cefuroxime axetil, cephalexin and locarbef for uncomplicated UTI. Note that some isolates may be susceptible to these agents while testing resistant to cefazolin.      -  Cefepime: S <=2      -  Cefoxitin: R >16      -  Ceftriaxone: R 8      -  Cefuroxime: I 16      -  Ciprofloxacin: S <=0.25      -  Ertapenem: S <=0.5      -  Gentamicin: S <=2      -  Imipenem: S <=1      -  Levofloxacin: S <=0.5      -  Meropenem: S <=1      -  Nitrofurantoin: S <=32 Should not be used to treat pyelonephritis      -  Piperacillin/Tazobactam: S <=8      -  Tobramycin: S <=2      -  Trimethoprim/Sulfamethoxazole: S <=0.5/9.5    Culture - Blood (collected 18 May 2024 22:58)  Source: .Blood Blood-Peripheral  Preliminary Report (22 May 2024 05:01):    No growth at 72 Hours    Culture - Blood (collected 18 May 2024 22:58)  Source: .Blood Blood-Peripheral  Preliminary Report (22 May 2024 05:01):    No growth at 72 Hours          < from: MR Abdomen w/wo IV Cont (05.20.24 @ 17:46) >  IMPRESSION:  A 7 cm complex cystic left parapelvic renal lesion of uncertain etiology;   differential includes infected parapelvic cyst. A cystic neoplasm is   considered less likely.  < end of copied text >       < from: CT Chest No Cont (05.20.24 @ 06:16) >  IMPRESSION:.  Partially imaged 6.5 cm left renal cystic mass with suspected soft tissue   wall thickening. This mass is not adequately assessed on this   examination. Dedicated or MRI of the abdomen is recommended to further   assess as infection/abscess and renal malignancy are diagnostic  considerations.  No evidence of pneumonia.  Small left pleural effusion.  < end of copied text >      ___________________________________________________________________________________  ===============>>  A S S E S S M E N T   A N D   P L A N <<===============  ------------------------------------------------------------------------------------------    · Assessment	  Patient is a 88yo Male with past medical history of HTN, prostate cancer post RT, prior syncope and ?Afib on coumadin, sent in by PMD for failed oral antibiotics treatment as outpatient for pneumonia ..  patient states for past two weeks patient has had weakness, chills, and burning with urination  ( which has now improved) , no fever, no couhg.. patient started on Augmentin which did not help much , patient sent in for IV antibiotics..  in ED patient found with LLL pneumonia and UTI, admitted for further management  patient mildly hypoxemic on room air ( low 90s), + some SOB ..   patient lives alone, has been recently needing to use his cane due to weakness..         Problem/Plan - 1:  ·  Problem:  pleural effusion on CT scan      diuretics per cardio as ordered      echo pending , r/o CHF  monitor vitals / O2, labs    Problem/Plan - 2:  ·  Problem: Acute UTI.   ·  Plan: continue antibiotics per ID  follow cultures  / sensitivities   encourage hydration  PT evaluation given weakness  supportive care otherwise.    ## renal cystic mass seen on CT     MRI as above       consulted > appreciated     Problem/Plan - 3:  ·  Problem: Chronic atrial fibrillation.   ·  Plan: metoprolol  coumadin per INR >> hold coumadin tonight   monitor vitals  cardio on board     Problem/Plan - 4:  ·  Problem: Hypertension.   ·  Plan: Continue home medications and monitor.    -GI/DVT Prophylaxis per protocol.     evaluation and safe DC planing   --------------------------------------------  Case discussed with patient.. Nurse Practitioner..   Education given on findings and plan of care  ___________________________  H. ISHA Deshpande.  Pager: [phone]     _________________________________________________________________________________________  ========>>  M E D I C A L   A T T E N D I N G    F O L L O W  U P  N O T E  <<=========  -----------------------------------------------------------------------------------------------------    - Patient seen and examined by me earlier today.   - Patient today overall doing ok, comfortable, eating fairly ( Not happy with the food)    Patient frustrated that he's been asking to get out of bed and walk, but nobody's walking with him!! orders placed patient reassured    ==================>> REVIEW OF SYSTEM <<=================    GEN: no fever, no chills, no pain  RESP: no SOB, no cough, no sputum  CVS: no chest pain, no palpitations  GI: no abdominal pain, no nausea  : no dysuria, no frequency  Neuro: no headache, no dizziness    ==================>> PHYSICAL EXAM <<=================    GEN: A&O X 3 , NAD , comfortable, pleasant, calm in bed   HEENT: NCAT, PERRL, MMM, hearing intact  CVS: S1S2 , regular , No M/R/G appreciated  PULM: + bilateral mild wheezing !! , no rhonchi   ABD.: soft. non tender, non distended,  bowel sounds present  Extrem: intact pulses , no edema         ( Note written / Date of service 05-22-24 ( This is certified to be the same as "ENTERED" date above ( for billing purposes)))    ==================>> MEDICATIONS <<====================    chlorhexidine 2% Cloths 1 Application(s) Topical daily  ciprofloxacin     Tablet 500 milliGRAM(s) Oral every 12 hours  furosemide    Tablet 20 milliGRAM(s) Oral daily  metoprolol succinate ER 50 milliGRAM(s) Oral daily  multivitamin 1 Tablet(s) Oral daily    MEDICATIONS  (PRN):  acetaminophen     Tablet .. 650 milliGRAM(s) Oral every 6 hours PRN Temp greater or equal to 38C (100.4F), Mild Pain (1 - 3)  aluminum hydroxide/magnesium hydroxide/simethicone Suspension 30 milliLiter(s) Oral every 4 hours PRN Dyspepsia  melatonin 3 milliGRAM(s) Oral at bedtime PRN Insomnia  ondansetron Injectable 4 milliGRAM(s) IV Push every 8 hours PRN Nausea and/or Vomiting    ___________  Active diet:  Diet, Regular  ___________________    ==================>> VITAL SIGNS <<==================    Vital Signs Last 24 HrsT(C): 36.7 (05-22-24 @ 11:51)  T(F): 98 (05-22-24 @ 11:51), Max: 98 (05-22-24 @ 05:13)  HR: 80 (05-22-24 @ 11:51) (68 - 83)  BP: 115/60 (05-22-24 @ 11:51)  RR: 18 (05-22-24 @ 11:51) (18 - 18)  SpO2: 95% (05-22-24 @ 11:51) (92% - 95%)       ==================>> LAB AND IMAGING <<==================                        13.0   10.26 )-----------( 181      ( 21 May 2024 07:14 )             39.0        05-21    141  |  100  |  16  ----------------------------<  116<H>  3.7   |  25  |  0.86    Ca    9.2      21 May 2024 07:14  Mg     2.0     05-21      WBC count:   10.26 <<== ,  10.59 <<== ,  12.23 <<==   Hemoglobin:   13.0 <<==,  12.9 <<==,  13.2 <<==  platelets:  181 <==, 187 <==, 195 <==    Creatinine:  0.86  <<==, 0.74  <<==, 0.86  <<==  Sodium:   141  <==, 138  <==, 137  <==       AST:          36(05-20) <== , 41(05-18) <==      ALT:        36(05-20)  <== , 29(05-18)  <==      AP:        77(05-20)  <=, 83(05-18)  <=     Bili:        0.6(05-20)  <=, 0.9(05-18)  <=    ____________________________    M I C R O B I O L O G Y :    Culture - Urine (collected 19 May 2024 02:45)  Source: Clean Catch Clean Catch (Midstream)  Final Report (22 May 2024 08:47):    50,000 - 99,000 CFU/mL Escherichia coli    <10,000 CFU/ml Normal Urogenital jamil present  Organism: Escherichia coli (22 May 2024 08:47)  Organism: Escherichia coli (22 May 2024 08:47)    Sensitivities:      Method Type: ADELA      -  Amoxicillin/Clavulanic Acid: R >16/8      -  Ampicillin: R >16 These ampicillin results predict results for amoxicillin      -  Ampicillin/Sulbactam: S 8/4      -  Aztreonam: S <=4      -  Cefazolin: R >16 For uncomplicated UTI with K. pneumoniae, E. coli, or P. mirablis: ADELA <=16 is sensitive and ADELA >=32 is resistant. This also predicts results for oral agents cefaclor, cefdinir, cefpodoxime, cefprozil, cefuroxime axetil, cephalexin and locarbef for uncomplicated UTI. Note that some isolates may be susceptible to these agents while testing resistant to cefazolin.      -  Cefepime: S <=2      -  Cefoxitin: R >16      -  Ceftriaxone: R 8      -  Cefuroxime: I 16      -  Ciprofloxacin: S <=0.25      -  Ertapenem: S <=0.5      -  Gentamicin: S <=2      -  Imipenem: S <=1      -  Levofloxacin: S <=0.5      -  Meropenem: S <=1      -  Nitrofurantoin: S <=32 Should not be used to treat pyelonephritis      -  Piperacillin/Tazobactam: S <=8      -  Tobramycin: S <=2      -  Trimethoprim/Sulfamethoxazole: S <=0.5/9.5    Culture - Blood (collected 18 May 2024 22:58)  Source: .Blood Blood-Peripheral  Preliminary Report (22 May 2024 05:01):    No growth at 72 Hours    Culture - Blood (collected 18 May 2024 22:58)  Source: .Blood Blood-Peripheral  Preliminary Report (22 May 2024 05:01):    No growth at 72 Hours      < from: TTE W or WO Ultrasound Enhancing Agent (05.22.24 @ 10:07) >  CONCLUSIONS:   1. Left ventricular cavity is small. Left ventricular wall thickness is normal. Left ventricular systolic function is normal with an ejection fraction of 62 % by Woodson's method of disks.   2. The left ventricular diastolic function is indeterminate.   3. Normal right ventricular cavity size, with normal wall thickness, and normal systolic function.   4. The left atrium is moderately dilated.   5. No echocardiographic evidence of vegetations.   6. The right atrium is dilated.   7. Mild mitral regurgitation.   8. Mild aortic regurgitation.   9. No prior echocardiogram is available for comparison.  < end of copied text >    < from: MR Abdomen w/wo IV Cont (05.20.24 @ 17:46) >  IMPRESSION:  A 7 cm complex cystic left parapelvic renal lesion of uncertain etiology;   differential includes infected parapelvic cyst. A cystic neoplasm is   considered less likely.  < end of copied text >       < from: CT Chest No Cont (05.20.24 @ 06:16) >  IMPRESSION:.  Partially imaged 6.5 cm left renal cystic mass with suspected soft tissue   wall thickening. This mass is not adequately assessed on this   examination. Dedicated or MRI of the abdomen is recommended to further   assess as infection/abscess and renal malignancy are diagnostic  considerations.  No evidence of pneumonia.  Small left pleural effusion.  < end of copied text >      ___________________________________________________________________________________  ===============>>  A S S E S S M E N T   A N D   P L A N <<===============  ------------------------------------------------------------------------------------------    · Assessment	  Patient is a 88yo Male with past medical history of HTN, prostate cancer post RT, prior syncope and ?Afib on coumadin, sent in by PMD for failed oral antibiotics treatment as outpatient for pneumonia ..  patient states for past two weeks patient has had weakness, chills, and burning with urination  ( which has now improved) , no fever, no couhg.. patient started on Augmentin which did not help much , patient sent in for IV antibiotics..  in ED patient found with LLL pneumonia and UTI, admitted for further management  patient mildly hypoxemic on room air ( low 90s), + some SOB ..   patient lives alone, has been recently needing to use his cane due to weakness..         Problem/Plan - 1:  ·  Problem:  pleural effusion on CT scan      diuretics per cardio as ordered      echo Negative for heart failure; mild MR  monitor vitals / O2, labs    Problem/Plan - 2:  ·  Problem: Acute UTI.   ·  Plan: continue antibiotics per ID  encourage hydration  PT evaluation given weakness  supportive care otherwise.    ## renal cystic mass seen on CT     MRI as above       consulted > appreciated     Problem/Plan - 3:  ·  Problem: Chronic atrial fibrillation.   ·  Plan: metoprolol  coumadin per INR >> hold coumadin tonight   monitor vitals  cardio on board     Problem/Plan - 4:  ·  Problem: Hypertension.   ·  Plan: Continue home medications and monitor.    -GI/DVT Prophylaxis per protocol.     evaluation and safe DC planing   --------------------------------------------  Case discussed with patient..   Education given on findings and plan of care  ___________________________  H. ISHA Deshpande.  Pager: [phone]

## 2024-05-22 NOTE — DIETITIAN INITIAL EVALUATION ADULT - PERTINENT MEDS FT
MEDICATIONS  (STANDING):  chlorhexidine 2% Cloths 1 Application(s) Topical daily  furosemide    Tablet 20 milliGRAM(s) Oral daily  metoprolol succinate ER 50 milliGRAM(s) Oral daily  multivitamin 1 Tablet(s) Oral daily  piperacillin/tazobactam IVPB.. 3.375 Gram(s) IV Intermittent every 8 hours    MEDICATIONS  (PRN):  acetaminophen     Tablet .. 650 milliGRAM(s) Oral every 6 hours PRN Temp greater or equal to 38C (100.4F), Mild Pain (1 - 3)  aluminum hydroxide/magnesium hydroxide/simethicone Suspension 30 milliLiter(s) Oral every 4 hours PRN Dyspepsia  melatonin 3 milliGRAM(s) Oral at bedtime PRN Insomnia  ondansetron Injectable 4 milliGRAM(s) IV Push every 8 hours PRN Nausea and/or Vomiting

## 2024-05-22 NOTE — PROGRESS NOTE ADULT - ASSESSMENT
88yo Male with past medical history of HTN, prostate cancer post RT, prior syncope and ?Afib on coumadin, sent in by PMD for failed oral antibiotics treatment as outpatient for pneumonia. For past two weeks patient has had weakness, chills, and burning with urination which has now improved. Patient was started on Augmentin which did not help much, patient sent in for IV antibiotics.    ED; Afebrile, VSS.  WBC 12. Started on ceftriaxone and Azithromycin.    UA >998 WBC  Limited RVP negative    Recent Ucx 5/7 with E coli resistant to ceftriaxone  Inpatient Urine culture with E coli sensitive to zosyn and quinolones.    CXR Left lower lobe opacity, which may represent infection versus atelectasis.  CT chest - no pneumonia noted      #UTI E coli     RECs:  - Stopped azithro - no pneumonia noted.  - Can change zosyn to cipro 500 mg po q12 x 4 more days to complete 7 days.  End date 5/26/24.  - continue to monitor clinically, trend CBC, fevers  - monitor diarrhea, IF wbc increases or worsens, please check c diff.    - apprec cardiology input  - f/up for renal cyst    I am away starting tomorrow.   Discussed plan with medicine team  If any changes/concerns please reconsult ID.  Pt can f/up with me as outpt in few weeks [address] [place] [address] [id number] [phone]    Payal Wolfe MD  [phone] (pager)  [phone] (office) .

## 2024-05-23 ENCOUNTER — TRANSCRIPTION ENCOUNTER (OUTPATIENT)
Age: 89
End: 2024-05-23

## 2024-05-23 VITALS
TEMPERATURE: 98 F | HEART RATE: 72 BPM | RESPIRATION RATE: 18 BRPM | OXYGEN SATURATION: 96 % | SYSTOLIC BLOOD PRESSURE: 114 MMHG | DIASTOLIC BLOOD PRESSURE: 71 MMHG

## 2024-05-23 LAB
ALBUMIN SERPL ELPH-MCNC: 3.1 G/DL — LOW (ref 3.3–5)
ALP SERPL-CCNC: 74 U/L — SIGNIFICANT CHANGE UP (ref 40–120)
ALT FLD-CCNC: 27 U/L — SIGNIFICANT CHANGE UP (ref 10–45)
ANION GAP SERPL CALC-SCNC: 15 MMOL/L — SIGNIFICANT CHANGE UP (ref 5–17)
AST SERPL-CCNC: 24 U/L — SIGNIFICANT CHANGE UP (ref 10–40)
BASOPHILS # BLD AUTO: 0.05 K/UL — SIGNIFICANT CHANGE UP (ref 0–0.2)
BASOPHILS NFR BLD AUTO: 0.3 % — SIGNIFICANT CHANGE UP (ref 0–2)
BILIRUB SERPL-MCNC: 0.8 MG/DL — SIGNIFICANT CHANGE UP (ref 0.2–1.2)
BUN SERPL-MCNC: 18 MG/DL — SIGNIFICANT CHANGE UP (ref 7–23)
CALCIUM SERPL-MCNC: 8.7 MG/DL — SIGNIFICANT CHANGE UP (ref 8.4–10.5)
CHLORIDE SERPL-SCNC: 98 MMOL/L — SIGNIFICANT CHANGE UP (ref 96–108)
CO2 SERPL-SCNC: 27 MMOL/L — SIGNIFICANT CHANGE UP (ref 22–31)
CREAT SERPL-MCNC: 0.92 MG/DL — SIGNIFICANT CHANGE UP (ref 0.5–1.3)
EGFR: 81 ML/MIN/1.73M2 — SIGNIFICANT CHANGE UP
EOSINOPHIL # BLD AUTO: 0.04 K/UL — SIGNIFICANT CHANGE UP (ref 0–0.5)
EOSINOPHIL NFR BLD AUTO: 0.3 % — SIGNIFICANT CHANGE UP (ref 0–6)
GLUCOSE SERPL-MCNC: 133 MG/DL — HIGH (ref 70–99)
HCT VFR BLD CALC: 41 % — SIGNIFICANT CHANGE UP (ref 39–50)
HCT VFR BLD CALC: 41.5 % — SIGNIFICANT CHANGE UP (ref 39–50)
HGB BLD-MCNC: 13.4 G/DL — SIGNIFICANT CHANGE UP (ref 13–17)
HGB BLD-MCNC: 14.1 G/DL — SIGNIFICANT CHANGE UP (ref 13–17)
IMM GRANULOCYTES NFR BLD AUTO: 2 % — HIGH (ref 0–0.9)
INR BLD: 2.72 RATIO — HIGH (ref 0.85–1.18)
LYMPHOCYTES # BLD AUTO: 15.3 % — SIGNIFICANT CHANGE UP (ref 13–44)
LYMPHOCYTES # BLD AUTO: 2.33 K/UL — SIGNIFICANT CHANGE UP (ref 1–3.3)
MAGNESIUM SERPL-MCNC: 1.8 MG/DL — SIGNIFICANT CHANGE UP (ref 1.6–2.6)
MCHC RBC-ENTMCNC: 32.3 PG — SIGNIFICANT CHANGE UP (ref 27–34)
MCHC RBC-ENTMCNC: 32.7 GM/DL — SIGNIFICANT CHANGE UP (ref 32–36)
MCHC RBC-ENTMCNC: 33 PG — SIGNIFICANT CHANGE UP (ref 27–34)
MCHC RBC-ENTMCNC: 34 GM/DL — SIGNIFICANT CHANGE UP (ref 32–36)
MCV RBC AUTO: 97.2 FL — SIGNIFICANT CHANGE UP (ref 80–100)
MCV RBC AUTO: 98.8 FL — SIGNIFICANT CHANGE UP (ref 80–100)
MONOCYTES # BLD AUTO: 1.28 K/UL — HIGH (ref 0–0.9)
MONOCYTES NFR BLD AUTO: 8.4 % — SIGNIFICANT CHANGE UP (ref 2–14)
NEUTROPHILS # BLD AUTO: 11.24 K/UL — HIGH (ref 1.8–7.4)
NEUTROPHILS NFR BLD AUTO: 73.7 % — SIGNIFICANT CHANGE UP (ref 43–77)
NRBC # BLD: 0 /100 WBCS — SIGNIFICANT CHANGE UP (ref 0–0)
NRBC # BLD: 0 /100 WBCS — SIGNIFICANT CHANGE UP (ref 0–0)
PHOSPHATE SERPL-MCNC: 2.1 MG/DL — LOW (ref 2.5–4.5)
PLATELET # BLD AUTO: 245 K/UL — SIGNIFICANT CHANGE UP (ref 150–400)
PLATELET # BLD AUTO: 261 K/UL — SIGNIFICANT CHANGE UP (ref 150–400)
POTASSIUM SERPL-MCNC: 3.7 MMOL/L — SIGNIFICANT CHANGE UP (ref 3.5–5.3)
POTASSIUM SERPL-SCNC: 3.7 MMOL/L — SIGNIFICANT CHANGE UP (ref 3.5–5.3)
PROT SERPL-MCNC: 5.9 G/DL — LOW (ref 6–8.3)
PROTHROM AB SERPL-ACNC: 29.1 SEC — HIGH (ref 9.5–13)
RBC # BLD: 4.15 M/UL — LOW (ref 4.2–5.8)
RBC # BLD: 4.27 M/UL — SIGNIFICANT CHANGE UP (ref 4.2–5.8)
RBC # FLD: 12.2 % — SIGNIFICANT CHANGE UP (ref 10.3–14.5)
RBC # FLD: 12.4 % — SIGNIFICANT CHANGE UP (ref 10.3–14.5)
SODIUM SERPL-SCNC: 140 MMOL/L — SIGNIFICANT CHANGE UP (ref 135–145)
WBC # BLD: 15.25 K/UL — HIGH (ref 3.8–10.5)
WBC # BLD: 16.02 K/UL — HIGH (ref 3.8–10.5)
WBC # FLD AUTO: 15.25 K/UL — HIGH (ref 3.8–10.5)
WBC # FLD AUTO: 16.02 K/UL — HIGH (ref 3.8–10.5)

## 2024-05-23 PROCEDURE — 87899 AGENT NOS ASSAY W/OPTIC: CPT

## 2024-05-23 PROCEDURE — A9585: CPT

## 2024-05-23 PROCEDURE — 87637 SARSCOV2&INF A&B&RSV AMP PRB: CPT

## 2024-05-23 PROCEDURE — 82435 ASSAY OF BLOOD CHLORIDE: CPT

## 2024-05-23 PROCEDURE — 83735 ASSAY OF MAGNESIUM: CPT

## 2024-05-23 PROCEDURE — 71250 CT THORAX DX C-: CPT | Mod: MC

## 2024-05-23 PROCEDURE — 85610 PROTHROMBIN TIME: CPT

## 2024-05-23 PROCEDURE — 85018 HEMOGLOBIN: CPT

## 2024-05-23 PROCEDURE — 97161 PT EVAL LOW COMPLEX 20 MIN: CPT

## 2024-05-23 PROCEDURE — 97116 GAIT TRAINING THERAPY: CPT

## 2024-05-23 PROCEDURE — 82803 BLOOD GASES ANY COMBINATION: CPT

## 2024-05-23 PROCEDURE — 81001 URINALYSIS AUTO W/SCOPE: CPT

## 2024-05-23 PROCEDURE — 84295 ASSAY OF SERUM SODIUM: CPT

## 2024-05-23 PROCEDURE — 84100 ASSAY OF PHOSPHORUS: CPT

## 2024-05-23 PROCEDURE — 85014 HEMATOCRIT: CPT

## 2024-05-23 PROCEDURE — 85025 COMPLETE CBC W/AUTO DIFF WBC: CPT

## 2024-05-23 PROCEDURE — 80053 COMPREHEN METABOLIC PANEL: CPT

## 2024-05-23 PROCEDURE — 87040 BLOOD CULTURE FOR BACTERIA: CPT

## 2024-05-23 PROCEDURE — 85730 THROMBOPLASTIN TIME PARTIAL: CPT

## 2024-05-23 PROCEDURE — 82330 ASSAY OF CALCIUM: CPT

## 2024-05-23 PROCEDURE — 84484 ASSAY OF TROPONIN QUANT: CPT

## 2024-05-23 PROCEDURE — 97110 THERAPEUTIC EXERCISES: CPT

## 2024-05-23 PROCEDURE — 96374 THER/PROPH/DIAG INJ IV PUSH: CPT

## 2024-05-23 PROCEDURE — 87449 NOS EACH ORGANISM AG IA: CPT

## 2024-05-23 PROCEDURE — 82947 ASSAY GLUCOSE BLOOD QUANT: CPT

## 2024-05-23 PROCEDURE — 74183 MRI ABD W/O CNTR FLWD CNTR: CPT | Mod: MC

## 2024-05-23 PROCEDURE — 84132 ASSAY OF SERUM POTASSIUM: CPT

## 2024-05-23 PROCEDURE — 71045 X-RAY EXAM CHEST 1 VIEW: CPT

## 2024-05-23 PROCEDURE — 99285 EMERGENCY DEPT VISIT HI MDM: CPT

## 2024-05-23 PROCEDURE — C8929: CPT

## 2024-05-23 PROCEDURE — 80048 BASIC METABOLIC PNL TOTAL CA: CPT

## 2024-05-23 PROCEDURE — 87640 STAPH A DNA AMP PROBE: CPT

## 2024-05-23 PROCEDURE — 87186 SC STD MICRODIL/AGAR DIL: CPT

## 2024-05-23 PROCEDURE — 87086 URINE CULTURE/COLONY COUNT: CPT

## 2024-05-23 PROCEDURE — 85027 COMPLETE CBC AUTOMATED: CPT

## 2024-05-23 PROCEDURE — 87641 MR-STAPH DNA AMP PROBE: CPT

## 2024-05-23 PROCEDURE — 80061 LIPID PANEL: CPT

## 2024-05-23 PROCEDURE — 36415 COLL VENOUS BLD VENIPUNCTURE: CPT

## 2024-05-23 PROCEDURE — 94640 AIRWAY INHALATION TREATMENT: CPT

## 2024-05-23 PROCEDURE — 83605 ASSAY OF LACTIC ACID: CPT

## 2024-05-23 RX ORDER — WARFARIN SODIUM 2.5 MG/1
1 TABLET ORAL
Qty: 0 | Refills: 0 | DISCHARGE

## 2024-05-23 RX ORDER — CIPROFLOXACIN LACTATE 400MG/40ML
1 VIAL (ML) INTRAVENOUS
Qty: 7 | Refills: 0
Start: 2024-05-23 | End: 2024-05-25

## 2024-05-23 RX ORDER — LACTOBACILLUS ACIDOPHILUS 100MM CELL
1 CAPSULE ORAL
Qty: 28 | Refills: 0
Start: 2024-05-23 | End: 2024-06-05

## 2024-05-23 RX ADMIN — Medication 500 MILLIGRAM(S): at 05:21

## 2024-05-23 RX ADMIN — Medication 1 TABLET(S): at 09:08

## 2024-05-23 RX ADMIN — Medication 20 MILLIGRAM(S): at 05:20

## 2024-05-23 RX ADMIN — Medication 1 TABLET(S): at 12:02

## 2024-05-23 RX ADMIN — CHLORHEXIDINE GLUCONATE 1 APPLICATION(S): 213 SOLUTION TOPICAL at 12:02

## 2024-05-23 RX ADMIN — Medication 50 MILLIGRAM(S): at 05:20

## 2024-05-23 NOTE — DISCHARGE NOTE PROVIDER - NSFOLLOWUPCLINICS_GEN_ALL_ED_FT
[facility]  [address] [place] [address]  Phone: [phone]  Fax:   Follow Up Time: 1 week

## 2024-05-23 NOTE — DISCHARGE NOTE PROVIDER - HOSPITAL COURSE
Patient is a 88yo Male with past medical history of HTN, prostate cancer post RT, prior syncope and ?Afib on coumadin, sent in by PMD for failed oral antibiotics treatment as outpatient for pneumonia ..  patient states for past two weeks patient has had weakness, chills, and burning with urination  ( which has now improved) , no fever, no couhg.. patient started on Augmentin which did not help much , patient sent in for IV antibiotics..  in ED patient found with LLL pneumonia and UTI, admitted for further management  patient mildly hypoxemic on room air ( low 90s), + some SOB ..   patient lives alone, has been recently needing to use his cane due to weakness..          Problem/Plan - 1:  ·  Problem:  pleural effusion on CT scan      diuretics per cardio as ordered >> will DC on discharge ..         echo Negative for heart failure; mild MR  monitor vitals / O2, labs     Problem/Plan - 2:  ·  Problem: Acute UTI.   ·  Plan: continue antibiotics per ID > finishing   encourage hydration  PT evaluation given weakness  supportive care otherwise.    ## renal cystic mass seen on CT     MRI as above       consulted > appreciated        >> outpatient follow up with       Problem/Plan - 3:  ·  Problem: Chronic atrial fibrillation.   ·  Plan: metoprolol  coumadin per INR >> 2 mg coumadin on DC  monitor INR as outpatient   cardio on board     I N R :  2.72  <<==, 3.90  <<==, 3.77  <<==, 3.85  <<==, 3.33  <<==     Problem/Plan - 4:  ·  Problem: Hypertension.   ·  Plan: Continue home medications and monitor.    -GI/DVT Prophylaxis per protocol.     Patient is a 89yo Male with past medical history of HTN, prostate cancer post RT, prior syncope and Afib on coumadin, sent in by PMD for failed oral antibiotics treatment as outpatient for pneumonia.  Patient states for past two weeks patient has had weakness, chills, and burning with urination  ( which has now improved) , no fever, no cough.  Patient started on Augmentin which did not help much , patient sent in for IV antibiotics.  In ED patient found with LLL pneumonia and UTI, admitted for further management  patient mildly hypoxemic on room air ( low 90s), + some SOB.  Patient lives alone, has been recently needing to use his cane due to weakness.        Problem/Plan - 1:  ·  Problem:  pleural effusion on CT scan      diuretics per cardio as ordered >> will DC on discharge ..         echo Negative for heart failure; mild MR  monitor vitals / O2, labs     Problem/Plan - 2:  ·  Problem: Acute UTI.   ·  Plan: continue antibiotics per ID > finishing   encourage hydration  PT evaluation given weakness - recommending home PT  supportive care otherwise.    ## renal cystic mass seen on CT     MRI same as above       consulted > appreciated        >> outpatient follow up with       Problem/Plan - 3:  ·  Problem: Chronic atrial fibrillation.   ·  Plan: metoprolol  INR elevated during admission, so coumadin held  coumadin per INR >> 2 mg coumadin on DC  monitor INR as outpatient   cardio on board        Problem/Plan - 4:  ·  Problem: Hypertension.   ·  Plan: Continue home medications and monitor.    Patient medically cleared for discharge, by Dr. Deshpande, with PCP/Cardiology, and Urology follow up on discharge.     Patient is a 88yo Male with past medical history of HTN, prostate cancer post RT, prior syncope and Afib on coumadin, sent in by PMD for failed oral antibiotics treatment as outpatient for pneumonia.  Patient states for past two weeks patient has had weakness, chills, and burning with urination  ( which has now improved) , no fever, no cough.  Patient started on Augmentin which did not help much , patient sent in for IV antibiotics.  In ED patient found with LLL pneumonia and UTI, admitted for further management  patient mildly hypoxemic on room air ( low 90s), + some SOB.  Patient lives alone, has been recently needing to use his cane due to weakness.        Problem/Plan - 1:  ·  Problem:  pleural effusion on CT scan      diuretics per cardio as ordered >> will DC on discharge ..         echo Negative for heart failure; mild MR  monitor vitals / O2, labs     Problem/Plan - 2:  ·  Problem: Acute UTI.   ·  Plan: continue antibiotics per ID > d/c on Cipro 500mg po BID through 5/26/2024  encourage hydration  PT evaluation given weakness - recommending home PT  supportive care otherwise.    ## renal cystic mass seen on CT     MRI same as above       consulted > appreciated        >> outpatient follow up with       Problem/Plan - 3:  ·  Problem: Chronic atrial fibrillation.   ·  Plan: metoprolol  INR elevated during admission, so coumadin held  coumadin per INR >> 2 mg coumadin on DC  monitor INR as outpatient   cardio on board        Problem/Plan - 4:  ·  Problem: Hypertension.   ·  Plan: Continue home medications and monitor.    Patient medically cleared for discharge, by Dr. Deshpande, with PCP/Cardiology, and Urology follow up on discharge.

## 2024-05-23 NOTE — DISCHARGE NOTE PROVIDER - NSDCCPCAREPLAN_GEN_ALL_CORE_FT
PRINCIPAL DISCHARGE DIAGNOSIS  Diagnosis: Small pleural effusion  Assessment and Plan of Treatment: Your echocardiogram was negative for heart failure and showed mild miltral regurgitation.  You must follow up with your cardiologist within 2-3 days of discharge - please call to make an appointment.      SECONDARY DISCHARGE DIAGNOSES  Diagnosis: Acute UTI  Assessment and Plan of Treatment: You are being discharged on an antibiotic, Cipro, which you will take through May 26, 2024 and then discontinue.  You will take probiotics for two weeks and then discontinue.  HOME CARE INSTRUCTIONS  Finish the medication even if you feel better after you have only taken some of the medication.  Drink enough water and fluids to keep your urine clear or pale yellow.  Avoid caffeine, tea, and carbonated beverages. They tend to irritate your bladder.  Empty your bladder often. Avoid holding urine for long periods of time.  Empty your bladder before and after sexual intercourse.  After a bowel movement, women should cleanse from front to back. Use each tissue only once.  SEEK MEDICAL CARE IF:  You have back pain.  You develop a fever.  Your symptoms do not begin to resolve within 3 days.  SEEK IMMEDIATE MEDICAL CARE IF:  You have severe back pain or lower abdominal pain.  You develop chills.  You have nausea or vomiting.  You have continued burning or discomfort with urination.      Diagnosis: Renal lesion  Assessment and Plan of Treatment: You were found to have a complex cystic left parapelvic renal lesion, seen on CT and MRI.    You must follow up with a urologist within one week of discharge - please call to make an appointment.  At this appointment, you must discuss what further workup/testing is needed.    Diagnosis: Leukocytosis  Assessment and Plan of Treatment: Your white blood cell count is elevated at 15.  You have no fever/chills noted and you are non-toxic in appearance.  You deny any complaints.  When you follow up with Dr. Zamudio in 2-3 days, you must have a CBC drawn to monitor your white blood cell count.  Notify your doctor immediately, or return to the ED, if you develop a fever, lethargy, or any other signs of infection.    Diagnosis: Chronic atrial fibrillation  Assessment and Plan of Treatment: You are being discharged on Coumadin 2mg oral at bedtime.  You must follow up wtih your cardiologist within 2-3 days of discharge - at this appointment, you must have your INR checked and your coumadin should be adjusted based on this level.  Atrial fibrillation is the most common heart rhythm problem.  The condition puts you at risk for has stroke and heart attack  It helps if you control your blood pressure, not drink more than 1-2 alcohol drinks per day, cut down on caffeine, getting treatment for over active thyroid gland, and get regular exercise  Call your doctor if you feel your heart racing or beating unusually, chest tightness or pain, lightheaded, faint, shortness of breath especially with exercise  It is important to take your heart medication as prescribed  You may be on anticoagulation which is very important to take as directed - you may need blood work to monitor drug levels      Diagnosis: Hypertension  Assessment and Plan of Treatment: Low salt diet  Activity as tolerated.  Take all medication as prescribed.  Follow up with your medical doctor for routine blood pressure monitoring at your next visit.  Notify your doctor if you have any of the following symptoms:   Dizziness, Lightheadedness, Blurry vision, Headache, Chest pain, Shortness of breath

## 2024-05-23 NOTE — CHART NOTE - NSCHARTNOTEFT_GEN_A_CORE
Request from Dr. Deshpande to facilitate patient discharge.  Medication reconciliation reviewed, revised, and resolved with Dr. Deshpande, who has medically cleared patient for discharge with follow up as advised.  Please refer to discharge note for detailed hospital course.

## 2024-05-23 NOTE — DISCHARGE NOTE PROVIDER - NSDCFUADDAPPT_GEN_ALL_CORE_FT
You must follow up with your primary medical doctor/cardiologist, Dr. Zamudio, within 2-3 days of discharge - please call to make an appointment.  At this appointment, you will need your INR checked and your coumadin dose adjusted based on the level.  You must also have your white blood cell count drawn to monitor.    You must follow up with your a urologist within one week of discharge to discuss the cystic left parapelvic renal lesion seen on MRI.  If you do not have a urologist, you can follow up at the [facility] - call [phone] to make an appointment.          APPTS ARE READY TO BE MADE: [ X] YES    Best Family or Patient Contact (if needed):    Additional Information about above appointments (if needed):    1: Primary medical doctor/cardiologist  2: Urologist  3:     Other comments or requests:    You must follow up with your primary medical doctor/cardiologist, Dr. Zamudio, within 2-3 days of discharge - please call to make an appointment.  At this appointment, you will need your INR checked and your coumadin dose adjusted based on the level.  You must also have your white blood cell count drawn to monitor.    You must follow up with your a urologist within one week of discharge to discuss the cystic left parapelvic renal lesion seen on MRI.  If you do not have a urologist, you can follow up at the [facility] - call [phone] to make an appointment.          APPTS ARE READY TO BE MADE: [ X] YES    Best Family or Patient Contact (if needed):    Additional Information about above appointments (if needed):    1: Primary medical doctor/cardiologist  2: Urologist  3:     Other comments or requests:     Provided patient with provider referral information, however patient prefers to schedule the appointments on their own.

## 2024-05-23 NOTE — PROGRESS NOTE ADULT - SUBJECTIVE AND OBJECTIVE BOX
Cardiovascular Disease Progress Note    Overnight events: No acute events overnight.  no new cardiac sx  Otherwise review of systems negative    Objective Findings:  T(C): 36.6 (05-23-24 @ 05:14), Max: 36.9 (05-22-24 @ 21:21)  HR: 82 (05-23-24 @ 05:14) (80 - 82)  BP: 113/67 (05-23-24 @ 05:14) (113/67 - 119/72)  RR: 18 (05-23-24 @ 05:14) (18 - 19)  SpO2: 95% (05-23-24 @ 05:14) (92% - 95%)  Wt(kg): --  Daily     Daily       Physical Exam:  Gen: NAD  HEENT: EOMI  CV: RRR, normal S1 + S2, no m/r/g  Lungs: CTAB  Abd: soft, non-tender  Ext: No edema    Telemetry:    Laboratory Data:          PT/INR - ( 22 May 2024 07:23 )   PT: 39.4 sec;   INR: 3.90 ratio                   Inpatient Medications:  MEDICATIONS  (STANDING):  chlorhexidine 2% Cloths 1 Application(s) Topical daily  ciprofloxacin     Tablet 500 milliGRAM(s) Oral every 12 hours  furosemide    Tablet 20 milliGRAM(s) Oral daily  lactobacillus acidophilus 1 Tablet(s) Oral two times a day with meals  metoprolol succinate ER 50 milliGRAM(s) Oral daily  multivitamin 1 Tablet(s) Oral daily      Assessment:  89yo Male with past medical history of HTN, prostate cancer post RT, prior syncope and Afib on coumadin, sent in by PMD for failed oral antibiotics treatment as outpatient for UTI    Recs:  cardiac stable  no e/o acs  vol status imroving- cw lasix 20mg po qd, echo wnl  coum per INR. cw rate control meds  ID follow up for UTI. currently on zosyn   consulted for renal mass. recs appreciated  will follow  dcp          Over 25 minutes spent on total encounter; more than 50% of the visit was spent counseling and/or coordinating care by the attending physician.      Urbano Zamudio MD   Cardiovascular Disease  [phone]

## 2024-05-23 NOTE — DISCHARGE NOTE PROVIDER - CARE PROVIDER_API CALL
Urbano Zamudio  Cardiovascular Disease  [address] [place] [id number]  Phone: [phone]  Fax: [phone]  Follow Up Time: 1-3 days

## 2024-05-23 NOTE — DISCHARGE NOTE PROVIDER - NSDCCAREPROVSEEN_GEN_ALL_CORE_FT
Evan Deshpande Evan Munozorbnilam Munozorbnilam Zamudio  [facility]      [ Greater than 35 min spent for discharge services.   BOB Deshpande ]       ( Note written / Date of service is the same as last day of patient stay  in the hospital ( for billing purposes)))

## 2024-05-23 NOTE — DISCHARGE NOTE PROVIDER - NSDCMRMEDTOKEN_GEN_ALL_CORE_FT
Coumadin 2 mg oral tablet: 1 tab(s) orally once a day alternate 2 to 3 mg based on INR  metoprolol succinate 50 mg oral tablet, extended release: 1 tab(s) orally once a day  Multiple Vitamins oral tablet: 1 tab(s) orally once a day   Coumadin 2 mg oral tablet: 1 tab(s) orally once a day you must follow up with your cardiologist within 2-3 days to have your INR checked  metoprolol succinate 50 mg oral tablet, extended release: 1 tab(s) orally once a day  Multiple Vitamins oral tablet: 1 tab(s) orally once a day   ciprofloxacin 500 mg oral tablet: 1 tab(s) orally every 12 hours Take through 5/26/2024 and then discontinue  Coumadin 2 mg oral tablet: 1 tab(s) orally once a day you must follow up with your cardiologist within 2-3 days to have your INR checked  lactobacillus acidophilus oral capsule: 1 cap(s) orally 2 times a day x 2 weeks and then discontinue  metoprolol succinate 50 mg oral tablet, extended release: 1 tab(s) orally once a day  Multiple Vitamins oral tablet: 1 tab(s) orally once a day

## 2024-05-23 NOTE — PROGRESS NOTE ADULT - PROVIDER SPECIALTY LIST ADULT
Cardiology
Internal Medicine
Internal Medicine
Cardiology
Cardiology
Infectious Disease
Internal Medicine
Internal Medicine
Infectious Disease
Infectious Disease

## 2024-05-24 LAB
CULTURE RESULTS: SIGNIFICANT CHANGE UP
CULTURE RESULTS: SIGNIFICANT CHANGE UP
SPECIMEN SOURCE: SIGNIFICANT CHANGE UP
SPECIMEN SOURCE: SIGNIFICANT CHANGE UP

## 2024-09-19 NOTE — HISTORY OF PRESENT ILLNESS
none
[de-identified] : thyroid nodule noted on chest CT. denies dysphagia, hoarseness, SOB or RT exposure.  normal TFTs, calcium 9.5. sonogram shows right 1.2 and 7 mm, left 1.8 cm  and 8 mm nodules. I have reviewed all old and new data and available images.

## 2024-11-04 ENCOUNTER — NON-APPOINTMENT (OUTPATIENT)
Age: 89
End: 2024-11-04

## 2024-11-04 ENCOUNTER — APPOINTMENT (OUTPATIENT)
Dept: ELECTROPHYSIOLOGY | Facility: CLINIC | Age: 89
End: 2024-11-04
Payer: MEDICARE

## 2024-11-04 VITALS
SYSTOLIC BLOOD PRESSURE: 114 MMHG | DIASTOLIC BLOOD PRESSURE: 71 MMHG | WEIGHT: 203 LBS | HEIGHT: 74 IN | HEART RATE: 93 BPM | BODY MASS INDEX: 26.05 KG/M2 | OXYGEN SATURATION: 91 %

## 2024-11-04 DIAGNOSIS — R55 SYNCOPE AND COLLAPSE: ICD-10-CM

## 2024-11-04 DIAGNOSIS — I10 ESSENTIAL (PRIMARY) HYPERTENSION: ICD-10-CM

## 2024-11-04 DIAGNOSIS — I48.21 PERMANENT ATRIAL FIBRILLATION: ICD-10-CM

## 2024-11-04 PROCEDURE — 93000 ELECTROCARDIOGRAM COMPLETE: CPT

## 2024-11-04 PROCEDURE — 99204 OFFICE O/P NEW MOD 45 MIN: CPT | Mod: 25

## 2024-11-04 RX ORDER — APIXABAN 2.5 MG/1
2.5 TABLET, FILM COATED ORAL
Qty: 60 | Refills: 5 | Status: ACTIVE | COMMUNITY
Start: 2024-11-04

## 2025-01-06 ENCOUNTER — OUTPATIENT (OUTPATIENT)
Dept: OUTPATIENT SERVICES | Facility: HOSPITAL | Age: 89
LOS: 1 days | End: 2025-01-06

## 2025-01-06 VITALS
RESPIRATION RATE: 18 BRPM | DIASTOLIC BLOOD PRESSURE: 76 MMHG | TEMPERATURE: 98 F | HEART RATE: 65 BPM | SYSTOLIC BLOOD PRESSURE: 124 MMHG | HEIGHT: 69 IN | WEIGHT: 188.94 LBS | OXYGEN SATURATION: 96 %

## 2025-01-06 DIAGNOSIS — Z98.890 OTHER SPECIFIED POSTPROCEDURAL STATES: Chronic | ICD-10-CM

## 2025-01-06 DIAGNOSIS — I48.21 PERMANENT ATRIAL FIBRILLATION: ICD-10-CM

## 2025-01-06 DIAGNOSIS — N40.0 BENIGN PROSTATIC HYPERPLASIA WITHOUT LOWER URINARY TRACT SYMPTOMS: ICD-10-CM

## 2025-01-06 LAB
BASOPHILS # BLD AUTO: 0.06 K/UL — SIGNIFICANT CHANGE UP (ref 0–0.2)
BASOPHILS NFR BLD AUTO: 0.6 % — SIGNIFICANT CHANGE UP (ref 0–2)
BLD GP AB SCN SERPL QL: NEGATIVE — SIGNIFICANT CHANGE UP
EOSINOPHIL # BLD AUTO: 0.19 K/UL — SIGNIFICANT CHANGE UP (ref 0–0.5)
EOSINOPHIL NFR BLD AUTO: 2 % — SIGNIFICANT CHANGE UP (ref 0–6)
HCT VFR BLD CALC: 47.4 % — SIGNIFICANT CHANGE UP (ref 39–50)
HGB BLD-MCNC: 15.8 G/DL — SIGNIFICANT CHANGE UP (ref 13–17)
IMM GRANULOCYTES NFR BLD AUTO: 0.2 % — SIGNIFICANT CHANGE UP (ref 0–0.9)
LYMPHOCYTES # BLD AUTO: 2.41 K/UL — SIGNIFICANT CHANGE UP (ref 1–3.3)
LYMPHOCYTES # BLD AUTO: 25.2 % — SIGNIFICANT CHANGE UP (ref 13–44)
MCHC RBC-ENTMCNC: 32.5 PG — SIGNIFICANT CHANGE UP (ref 27–34)
MCHC RBC-ENTMCNC: 33.3 G/DL — SIGNIFICANT CHANGE UP (ref 32–36)
MCV RBC AUTO: 97.5 FL — SIGNIFICANT CHANGE UP (ref 80–100)
MONOCYTES # BLD AUTO: 0.64 K/UL — SIGNIFICANT CHANGE UP (ref 0–0.9)
MONOCYTES NFR BLD AUTO: 6.7 % — SIGNIFICANT CHANGE UP (ref 2–14)
NEUTROPHILS # BLD AUTO: 6.26 K/UL — SIGNIFICANT CHANGE UP (ref 1.8–7.4)
NEUTROPHILS NFR BLD AUTO: 65.3 % — SIGNIFICANT CHANGE UP (ref 43–77)
PLATELET # BLD AUTO: 191 K/UL — SIGNIFICANT CHANGE UP (ref 150–400)
RBC # BLD: 4.86 M/UL — SIGNIFICANT CHANGE UP (ref 4.2–5.8)
RBC # FLD: 12.4 % — SIGNIFICANT CHANGE UP (ref 10.3–14.5)
RH IG SCN BLD-IMP: NEGATIVE — SIGNIFICANT CHANGE UP
RH IG SCN BLD-IMP: NEGATIVE — SIGNIFICANT CHANGE UP
WBC # BLD: 9.58 K/UL — SIGNIFICANT CHANGE UP (ref 3.8–10.5)
WBC # FLD AUTO: 9.58 K/UL — SIGNIFICANT CHANGE UP (ref 3.8–10.5)

## 2025-01-06 NOTE — H&P PST ADULT - PROBLEM SELECTOR PLAN 3
Patient eligible for brett risk screen age>75? Yes     Health care proxy paperwork given to patient? Yes     Impaired mobility (ie: uses cane, walker, wheelchair, or assist device)? Yes  Known dementia diagnosis? Yes/no    Impaired functional status (METS<4)? no    Malnutrition BMI<20? no

## 2025-01-06 NOTE — H&P PST ADULT - NSANTHOSAYNRD_GEN_A_CORE
No. GUNNER screening performed.  STOP BANG Legend: 0-2 = LOW Risk; 3-4 = INTERMEDIATE Risk; 5-8 = HIGH Risk

## 2025-01-06 NOTE — H&P PST ADULT - PROBLEM SELECTOR PLAN 1
scheduled for MICRA leadless Medtronic pacemaker with Dr. Mccartney on 1/14/2025.   Verbal and written pre-op instructions provided to patient. Patient verbalized understanding and will call surgeons office for revised instructions if surgery is rescheduled.  Pepcid for GI prophylaxis provided.   patient given verbal and written instruction with teach back on chlorhexidine shampoo, and the patient verbalized understanding with return demonstration.  Pt. instructed to continue Eliquis  as prescribed.

## 2025-01-06 NOTE — H&P PST ADULT - ASSESSMENT
89 y/o man with Hx of TIA, prostate CA/BPH, and permanent atrial fibrillation, on Eliquis with recent  episode of syncope scheduled for MICRA leadless Medtronic pacemaker with Dr. Mccartney.

## 2025-01-06 NOTE — H&P PST ADULT - RESPIRATORY AND THORAX COMMENTS
He has  been struggling with SOB over the last few years. Did see Pulmonary in the past, former smoker. No recent f/u.

## 2025-01-06 NOTE — H&P PST ADULT - CARDIOVASCULAR COMMENTS
History of permanent Afib & chronic shortness of breath over several years ( pt. reports is his baseline and no recent change)  He does not have a pulmonologist

## 2025-01-06 NOTE — H&P PST ADULT - NSICDXPASTMEDICALHX_GEN_ALL_CORE_FT
PAST MEDICAL HISTORY:  BPH (benign prostatic hyperplasia)     History of TIAs     Permanent atrial fibrillation     Prostate cancer

## 2025-01-06 NOTE — H&P PST ADULT - HISTORY OF PRESENT ILLNESS
88 y/o man with Hx of TIA, prostate CA/BPH, and permanent atrial fibrillation, on Eliquis with recent  episode of syncope while sitting in front of the computer. Does not recall any prodrome but it did occur at 230am. Does not think he fell asleep but he says he was in front of the computer and then had LOC, unsure for how long. He awoke on the floor, no injury to head or face. S/P Holter monitor x 3-4 days now scheduled for MICRA leadless Medtronic pacemaker with Dr. Mccartney.   Denies chest pain, palpitations, and no further concern for syncope or near syncope.  ? 88 y/o man with Hx of TIA, prostate CA/BPH, and permanent atrial fibrillation, on Eliquis with recent  episode of syncope while sitting in front of the computer,  he awoke on the floor, no injury to head or face. S/P Holter monitor x 3-4 days now scheduled for MICRA leadless Medtronic pacemaker with Dr. Mccartney.     ?

## 2025-01-13 NOTE — ASU PATIENT PROFILE, ADULT - FALL HARM RISK - HARM RISK INTERVENTIONS

## 2025-01-14 ENCOUNTER — RESULT REVIEW (OUTPATIENT)
Age: 89
End: 2025-01-14

## 2025-01-14 ENCOUNTER — OUTPATIENT (OUTPATIENT)
Dept: OUTPATIENT SERVICES | Facility: HOSPITAL | Age: 89
LOS: 1 days | Discharge: HOME | End: 2025-01-14
Payer: MEDICARE

## 2025-01-14 ENCOUNTER — TRANSCRIPTION ENCOUNTER (OUTPATIENT)
Age: 89
End: 2025-01-14

## 2025-01-14 VITALS
HEART RATE: 82 BPM | OXYGEN SATURATION: 98 % | RESPIRATION RATE: 16 BRPM | SYSTOLIC BLOOD PRESSURE: 152 MMHG | DIASTOLIC BLOOD PRESSURE: 76 MMHG

## 2025-01-14 VITALS
HEART RATE: 53 BPM | TEMPERATURE: 98 F | RESPIRATION RATE: 16 BRPM | OXYGEN SATURATION: 97 % | DIASTOLIC BLOOD PRESSURE: 73 MMHG | HEIGHT: 74 IN | SYSTOLIC BLOOD PRESSURE: 150 MMHG | WEIGHT: 179.9 LBS

## 2025-01-14 DIAGNOSIS — I48.21 PERMANENT ATRIAL FIBRILLATION: ICD-10-CM

## 2025-01-14 DIAGNOSIS — Z98.890 OTHER SPECIFIED POSTPROCEDURAL STATES: Chronic | ICD-10-CM

## 2025-01-14 PROBLEM — Z86.73 PERSONAL HISTORY OF TRANSIENT ISCHEMIC ATTACK (TIA), AND CEREBRAL INFARCTION WITHOUT RESIDUAL DEFICITS: Chronic | Status: ACTIVE | Noted: 2025-01-06

## 2025-01-14 PROCEDURE — 71045 X-RAY EXAM CHEST 1 VIEW: CPT | Mod: 26

## 2025-01-14 PROCEDURE — 33274 TCAT INSJ/RPL PERM LDLS PM: CPT

## 2025-01-14 PROCEDURE — 93010 ELECTROCARDIOGRAM REPORT: CPT

## 2025-01-14 RX ORDER — SODIUM CHLORIDE 9 MG/ML
3 INJECTION, SOLUTION INTRAMUSCULAR; INTRAVENOUS; SUBCUTANEOUS EVERY 8 HOURS
Refills: 0 | Status: DISCONTINUED | OUTPATIENT
Start: 2025-01-14 | End: 2025-01-28

## 2025-01-14 RX ORDER — TAMSULOSIN HYDROCHLORIDE 0.4 MG/1
1 CAPSULE ORAL
Refills: 0 | DISCHARGE

## 2025-01-14 RX ORDER — METOPROLOL TARTRATE 50 MG
1 TABLET ORAL
Refills: 0 | DISCHARGE

## 2025-01-14 RX ORDER — APIXABAN 5 MG/1
1 TABLET, FILM COATED ORAL
Qty: 0 | Refills: 0 | DISCHARGE

## 2025-01-14 NOTE — ASU PREOP CHECKLIST - SPO2 (%)
If you have an active [facility] account, please look for your well child questionnaire to come to your [facility] account before your next well child visit.    Well-Child Checkup: 6 to 10 Years     Struggles in school can indicate problems with a childs health or development. If your child is having trouble in school, talk to the childs doctor.     Even if your child is healthy, keep bringing him or her in for yearly checkups. These visits ensure your childs health is protected with scheduled vaccinations and health screenings. Your child's healthcare provider will also check his or her growth and development. This sheet describes some of what you can expect.  School and social issues  Here are some topics you, your child, and the healthcare provider may want to discuss during this visit:  · Reading. Does your child like to read? Is the child reading at the right level for his or her age group?   · Friendships. Does your child have friends at school? How do they get along? Do you like your childs friends? Do you have any concerns about your childs friendships or problems that may be happening with other children (such as bullying)?  · Activities. What does your child like to do for fun? Is he or she involved in after-school activities such as sports, scouting, or music classes?   · Family interaction. How are things at home? Does your child have good relationships with others in the family? Does he or she talk to you about problems? How is the childs behavior at home?   · Behavior and participation at school. How does your child act at school? Does the child follow the classroom routine and take part in group activities? What do teachers say about the childs behavior? Is homework finished on time? Do you or other family members help with homework?  · Household chores. Does your child help around the house with chores such as taking out the trash or setting the table?  Nutrition and exercise tips  Teaching  your child healthy eating and lifestyle habits can lead to a lifetime of good health. To help, set a good example with your words and actions. Remember, good habits formed now will stay with your child forever. Here are some tips:  · Help your child get at least 30 minutes to 60 minutes of active play per day. Moving around helps keep your child healthy. Go to the park, ride bikes, or play active games like tag or ball.  · Limit screen time to  a maximum of 1 hour to 2 hours each day. This includes time spent watching TV, playing video games, using the computer, and texting. If your child has a TV, computer, or video game console in the bedroom,  replace it with a music player. For many kids, dancing and singing are fun ways to get moving.  · Limit sugary drinks. Soda, juice, and sports drinks lead to unhealthy weight gain and tooth decay. Water and low-fat or nonfat milk are best to drink. In moderation (a small glass no more than once a day), 100% fruit juice is OK. Save soda and other sugary drinks for special occasions.   · Serve nutritious foods. Keep a variety of healthy foods on hand for snacks, including fresh fruits and vegetables, lean meats, and whole grains. Foods like French fries, candy, and snack foods should only be served rarely.   · Serve child-sized portions. Children dont need as much food as adults. Serve your child portions that make sense for his or her age and size. Let your child stop eating when he or she is full. If your child is still hungry after a meal, offer more vegetables or fruit.  · Ask the healthcare provider about your childs weight. Your child should gain about 4 pounds to 5 pounds each year. If your child is gaining more than that, talk to the health care provider about healthy eating habits and exercise guidelines.  · Bring your child to the dentist at least twice a year for teeth cleaning and a checkup.  Sleeping tips  Now that your child is in school, a good nights  sleep is even more important. At this age, your child needs about 10 hours of sleep each night. Here are some tips:  · Set a bedtime and make sure your child follows it each night.  · TV, computer, and video games can agitate a child and make it hard to calm down for the night. Turn them off at least an hour before bed. Instead, read a chapter of a book together.  · Remind your child to brush and floss his or her teeth before bed. Directly supervise your child's dental self-care to ensure that both the back teeth and the front teeth are cleaned.  Safety tips  · When riding a bike, your child should wear a helmet with the strap fastened. While roller-skating, roller-blading, or using a scooter or skateboard, its safest to wear wrist guards, elbow pads, and knee pads, as well as a helmet.  · In the car, continue to use a booster seat until your child is taller than 4 feet 9 inches. At this height, kids are able to sit with the seat belt fitting correctly over the collarbone and hips. Ask the healthcare provider if you have questions about when your child will be ready to stop using a booster seat. All children younger than 13 should sit in the back seat.  · Teach your child not to talk to strangers or go anywhere with a stranger.  · Teach your child to swim. Many communities offer low-cost swimming lessons. Do not let your child play in or around a pool unattended, even if he or she knows how to swim.  Vaccinations  Based on recommendations from the CDC, at this visit your child may receive the following vaccinations:  · Diphtheria, tetanus, and pertussis (age 6 only)  · Human papillomavirus (HPV) (ages 9 and up)  · Influenza (flu), annually  · Measles, mumps, and rubella  · Polio  · Varicella (chickenpox)  Bedwetting: Its not your childs fault  Bedwetting, or urinating when sleeping, can be frustrating for both you and your child. But its usually not a sign of a major problem. Your childs body may simply need  more time to mature. If a child suddenly starts wetting the bed, the cause is often a lifestyle change (such as starting school) or a stressful event (such as the birth of a sibling). But whatever the cause, its not in your childs direct control. If your child wets the bed:  · Keep in mind that your child is not wetting on purpose. Never punish or tease a child for wetting the bed. Punishment or shaming may make the problem worse, not better.  · To help your child, be positive and supportive. Praise your child for not wetting and even for trying hard to stay dry.  · Two hours before bedtime, dont serve your child anything to drink.  · Remind your child to use the toilet before bed. You could also wake him or her to use the bathroom before you go to bed yourself.  · Have a routine for changing sheets and pajamas when the child wets. Try to make this routine as calm and orderly as possible. This will help keep both you and your child from getting too upset or frustrated to go back to sleep.  · Put up a calendar or chart and give your child a star or sticker for nights that he or she doesnt wet the bed.  · Encourage your child to get out of bed and try to use the toilet if he or she wakes during the night. Put night-lights in the bedroom, hallway, and bathroom to help your child feel safer walking to the bathroom.  · If you have concerns about bedwetting, discuss them with the health care provider.       Next checkup at: _______________________________     PARENT NOTES:  Date Last Reviewed: 10/2/2014  © 7485-7766 [employer]. [address] [place] [address], [place] [address]. All rights reserved. This information is not intended as a substitute for professional medical care. Always follow your healthcare professional's instructions.         97

## 2025-01-14 NOTE — ASU DISCHARGE PLAN (ADULT/PEDIATRIC) - ASU DC SPECIAL INSTRUCTIONSFT
Patient is s/p leadless PM placement  with Dr. Mccartney.  Teaching provided to patient regarding right groin site care.  Right groin without hematoma, ecchymosis, drainage, pain, or bleeding.    - may shower after 24 hrs, otherwise keep groin incision sites dry and clean.    - Avoid activities such as jogging/excessive stair climbing/weight lifting for the next 7 days    - Take Acetaminophen (Tylenol) 500mg, one to two tablets every 8 hours as needed for pain relief.    - Pt was instructed to call [phone] if the following occurs:      - fever with temperature > 101      - swelling or bleeding at the groin incision site(s)  - Outpatient F/U is scheduled with Device clinic on 1/15/25 @ 8:40 am for suture removal and for device follow up on 2/6/25 @ 10am    All questions answered to patient's satisfaction.  Follow up letter and instructions left in the care of the patient.

## 2025-01-14 NOTE — ASU DISCHARGE PLAN (ADULT/PEDIATRIC) - FINANCIAL ASSISTANCE
[facility] provides services at a reduced cost to those who are determined to be eligible through [facility]’s financial assistance program. Information regarding [facility]’s financial assistance program can be found by going to [email or web address] or by calling [phone].

## 2025-01-14 NOTE — CHART NOTE - NSCHARTNOTEFT_GEN_A_CORE
Called to bedside by RN to assess femoral access site. Pt with suture in place over RFV with fully saturated gauze. no active bleeding could be seen but manual pressure was held for ten minutes. The saturated gauze could not be removed because it is held in place by the suture so new fresh gauze was placed over top. Dr. Mccartney was made aware and would still like patient to be discharged and follow up tomorrow as an outpatient. Pt will be reassessed for further bleeding after ambulation.

## 2025-01-15 ENCOUNTER — APPOINTMENT (OUTPATIENT)
Dept: ELECTROPHYSIOLOGY | Facility: CLINIC | Age: 89
End: 2025-01-15

## 2025-01-15 PROBLEM — N40.0 BENIGN PROSTATIC HYPERPLASIA WITHOUT LOWER URINARY TRACT SYMPTOMS: Chronic | Status: ACTIVE | Noted: 2025-01-06

## 2025-01-29 PROBLEM — I48.21 PERMANENT ATRIAL FIBRILLATION: Chronic | Status: ACTIVE | Noted: 2025-01-06

## 2025-01-29 PROBLEM — C61 MALIGNANT NEOPLASM OF PROSTATE: Chronic | Status: ACTIVE | Noted: 2025-01-06

## 2025-02-06 ENCOUNTER — NON-APPOINTMENT (OUTPATIENT)
Age: 89
End: 2025-02-06

## 2025-02-06 ENCOUNTER — APPOINTMENT (OUTPATIENT)
Dept: ELECTROPHYSIOLOGY | Facility: CLINIC | Age: 89
End: 2025-02-06

## 2025-02-06 VITALS — OXYGEN SATURATION: 98 % | HEART RATE: 58 BPM | DIASTOLIC BLOOD PRESSURE: 84 MMHG | SYSTOLIC BLOOD PRESSURE: 136 MMHG
